# Patient Record
Sex: FEMALE | Race: WHITE | NOT HISPANIC OR LATINO | Employment: OTHER | URBAN - METROPOLITAN AREA
[De-identification: names, ages, dates, MRNs, and addresses within clinical notes are randomized per-mention and may not be internally consistent; named-entity substitution may affect disease eponyms.]

---

## 2017-03-01 ENCOUNTER — TELEPHONE (OUTPATIENT)
Dept: SURGERY | Facility: CLINIC | Age: 72
End: 2017-03-01

## 2017-12-04 ENCOUNTER — CONVERSION ENCOUNTER (OUTPATIENT)
Dept: OTHER | Facility: HOSPITAL | Age: 72
End: 2017-12-04

## 2018-03-20 ENCOUNTER — OFFICE VISIT CONVERTED (OUTPATIENT)
Dept: FAMILY MEDICINE CLINIC | Age: 73
End: 2018-03-20
Attending: FAMILY MEDICINE

## 2018-11-01 ENCOUNTER — OFFICE VISIT CONVERTED (OUTPATIENT)
Dept: FAMILY MEDICINE CLINIC | Age: 73
End: 2018-11-01
Attending: FAMILY MEDICINE

## 2018-12-05 ENCOUNTER — CONVERSION ENCOUNTER (OUTPATIENT)
Dept: OTHER | Facility: HOSPITAL | Age: 73
End: 2018-12-05

## 2019-03-18 ENCOUNTER — HOSPITAL ENCOUNTER (OUTPATIENT)
Dept: OTHER | Facility: HOSPITAL | Age: 74
Discharge: HOME | End: 2019-03-18
Attending: FAMILY MEDICINE

## 2019-03-18 LAB
25(OH)D3 SERPL-MCNC: 53.6 NG/ML (ref 30–100)
ALBUMIN SERPL-MCNC: 3.7 G/DL (ref 3.5–5)
ALBUMIN/GLOB SERPL: 1.1 {RATIO} (ref 1.4–2.6)
ALP SERPL-CCNC: 82 U/L (ref 43–160)
ALT SERPL-CCNC: 14 U/L (ref 10–40)
ANION GAP SERPL CALC-SCNC: 16 MMOL/L (ref 8–19)
AST SERPL-CCNC: 16 U/L (ref 15–50)
BILIRUB SERPL-MCNC: 0.42 MG/DL (ref 0.2–1.3)
BUN SERPL-MCNC: 11 MG/DL (ref 5–25)
BUN/CREAT SERPL: 11 {RATIO} (ref 6–20)
CALCIUM SERPL-MCNC: 9.5 MG/DL (ref 8.7–10.4)
CHLORIDE SERPL-SCNC: 105 MMOL/L (ref 99–111)
CHOLEST SERPL-MCNC: 176 MG/DL (ref 107–200)
CHOLEST/HDLC SERPL: 3.6 {RATIO} (ref 3–6)
CONV CO2: 25 MMOL/L (ref 22–32)
CONV TOTAL PROTEIN: 7.2 G/DL (ref 6.3–8.2)
CREAT UR-MCNC: 0.99 MG/DL (ref 0.5–0.9)
GFR SERPLBLD BASED ON 1.73 SQ M-ARVRAT: 57 ML/MIN/{1.73_M2}
GLOBULIN UR ELPH-MCNC: 3.5 G/DL (ref 2–3.5)
GLUCOSE SERPL-MCNC: 91 MG/DL (ref 65–99)
HDLC SERPL-MCNC: 49 MG/DL (ref 40–60)
LDLC SERPL CALC-MCNC: 111 MG/DL (ref 70–100)
OSMOLALITY SERPL CALC.SUM OF ELEC: 293 MOSM/KG (ref 273–304)
POTASSIUM SERPL-SCNC: 4.3 MMOL/L (ref 3.5–5.3)
SODIUM SERPL-SCNC: 142 MMOL/L (ref 135–147)
TRIGL SERPL-MCNC: 78 MG/DL (ref 40–150)
TSH SERPL-ACNC: 0.9 M[IU]/L (ref 0.27–4.2)
VLDLC SERPL-MCNC: 16 MG/DL (ref 5–37)

## 2019-03-21 ENCOUNTER — OFFICE VISIT CONVERTED (OUTPATIENT)
Dept: FAMILY MEDICINE CLINIC | Age: 74
End: 2019-03-21
Attending: FAMILY MEDICINE

## 2019-09-11 ENCOUNTER — OFFICE VISIT CONVERTED (OUTPATIENT)
Dept: FAMILY MEDICINE CLINIC | Age: 74
End: 2019-09-11
Attending: FAMILY MEDICINE

## 2019-09-11 ENCOUNTER — HOSPITAL ENCOUNTER (OUTPATIENT)
Dept: OTHER | Facility: HOSPITAL | Age: 74
Discharge: HOME | End: 2019-09-11
Attending: FAMILY MEDICINE

## 2019-09-11 LAB
25(OH)D3 SERPL-MCNC: 65.5 NG/ML (ref 30–100)
ALBUMIN SERPL-MCNC: 4.2 G/DL (ref 3.5–5)
ALBUMIN/GLOB SERPL: 1.4 {RATIO} (ref 1.4–2.6)
ALP SERPL-CCNC: 79 U/L (ref 43–160)
ALT SERPL-CCNC: 14 U/L (ref 10–40)
ANION GAP SERPL CALC-SCNC: 18 MMOL/L (ref 8–19)
AST SERPL-CCNC: 17 U/L (ref 15–50)
BILIRUB SERPL-MCNC: 0.41 MG/DL (ref 0.2–1.3)
BUN SERPL-MCNC: 11 MG/DL (ref 5–25)
BUN/CREAT SERPL: 11 {RATIO} (ref 6–20)
CALCIUM SERPL-MCNC: 9.8 MG/DL (ref 8.7–10.4)
CHLORIDE SERPL-SCNC: 105 MMOL/L (ref 99–111)
CONV CO2: 26 MMOL/L (ref 22–32)
CONV TOTAL PROTEIN: 7.3 G/DL (ref 6.3–8.2)
CREAT UR-MCNC: 0.99 MG/DL (ref 0.5–0.9)
ERYTHROCYTE [DISTWIDTH] IN BLOOD BY AUTOMATED COUNT: 14 % (ref 11.5–14.5)
GFR SERPLBLD BASED ON 1.73 SQ M-ARVRAT: 56 ML/MIN/{1.73_M2}
GLOBULIN UR ELPH-MCNC: 3.1 G/DL (ref 2–3.5)
GLUCOSE SERPL-MCNC: 86 MG/DL (ref 65–99)
HBA1C MFR BLD: 13.9 G/DL (ref 12–16)
HCT VFR BLD AUTO: 43.7 % (ref 37–47)
MCH RBC QN AUTO: 27.2 PG (ref 27–31)
MCHC RBC AUTO-ENTMCNC: 31.8 G/DL (ref 33–37)
MCV RBC AUTO: 85.5 FL (ref 81–99)
OSMOLALITY SERPL CALC.SUM OF ELEC: 297 MOSM/KG (ref 273–304)
PLATELET # BLD AUTO: 211 10*3/UL (ref 130–400)
PMV BLD AUTO: 9.6 FL (ref 7.4–10.4)
POTASSIUM SERPL-SCNC: 5.1 MMOL/L (ref 3.5–5.3)
RBC # BLD AUTO: 5.11 10*6/UL (ref 4.2–5.4)
SODIUM SERPL-SCNC: 144 MMOL/L (ref 135–147)
T4 FREE SERPL-MCNC: 1.7 NG/DL (ref 0.9–1.8)
TSH SERPL-ACNC: 0.46 M[IU]/L (ref 0.27–4.2)
VIT B12 SERPL-MCNC: 848 PG/ML (ref 211–911)
WBC # BLD AUTO: 5.29 10*3/UL (ref 4.8–10.8)

## 2019-11-27 ENCOUNTER — CONVERSION ENCOUNTER (OUTPATIENT)
Dept: CARDIOLOGY | Facility: CLINIC | Age: 74
End: 2019-11-27
Attending: INTERNAL MEDICINE

## 2020-03-02 ENCOUNTER — HOSPITAL ENCOUNTER (OUTPATIENT)
Dept: OTHER | Facility: HOSPITAL | Age: 75
Discharge: HOME | End: 2020-03-02
Attending: FAMILY MEDICINE

## 2020-03-02 LAB
25(OH)D3 SERPL-MCNC: 67.6 NG/ML (ref 30–100)
ALBUMIN SERPL-MCNC: 3.8 G/DL (ref 3.5–5)
ALBUMIN/GLOB SERPL: 1.1 {RATIO} (ref 1.4–2.6)
ALP SERPL-CCNC: 72 U/L (ref 43–160)
ALT SERPL-CCNC: 15 U/L (ref 10–40)
ANION GAP SERPL CALC-SCNC: 18 MMOL/L (ref 8–19)
AST SERPL-CCNC: 16 U/L (ref 15–50)
BASOPHILS # BLD MANUAL: 0.04 10*3/UL (ref 0–0.2)
BASOPHILS NFR BLD MANUAL: 0.7 % (ref 0–3)
BILIRUB SERPL-MCNC: 0.52 MG/DL (ref 0.2–1.3)
BUN SERPL-MCNC: 14 MG/DL (ref 5–25)
BUN/CREAT SERPL: 12 {RATIO} (ref 6–20)
CALCIUM SERPL-MCNC: 9.5 MG/DL (ref 8.7–10.4)
CHLORIDE SERPL-SCNC: 103 MMOL/L (ref 99–111)
CONV CO2: 24 MMOL/L (ref 22–32)
CONV TOTAL PROTEIN: 7.2 G/DL (ref 6.3–8.2)
CREAT UR-MCNC: 1.14 MG/DL (ref 0.5–0.9)
DEPRECATED RDW RBC AUTO: 43 FL
EOSINOPHIL # BLD MANUAL: 0.14 10*3/UL (ref 0–0.7)
EOSINOPHIL NFR BLD MANUAL: 2.4 % (ref 0–7)
ERYTHROCYTE [DISTWIDTH] IN BLOOD BY AUTOMATED COUNT: 13.6 % (ref 11.5–14.5)
GFR SERPLBLD BASED ON 1.73 SQ M-ARVRAT: 47 ML/MIN/{1.73_M2}
GLOBULIN UR ELPH-MCNC: 3.4 G/DL (ref 2–3.5)
GLUCOSE SERPL-MCNC: 89 MG/DL (ref 65–99)
GRANS (ABSOLUTE): 3.61 10*3/UL (ref 2–8)
GRANS: 62.3 % (ref 30–85)
HBA1C MFR BLD: 14.5 G/DL (ref 12–16)
HCT VFR BLD AUTO: 44.4 % (ref 37–47)
IMM GRANULOCYTES # BLD: 0.01 10*3/UL (ref 0–0.54)
IMM GRANULOCYTES NFR BLD: 0.2 % (ref 0–0.43)
LYMPHOCYTES # BLD MANUAL: 1.4 10*3/UL (ref 1–5)
LYMPHOCYTES NFR BLD MANUAL: 10.3 % (ref 3–10)
MCH RBC QN AUTO: 28 PG (ref 27–31)
MCHC RBC AUTO-ENTMCNC: 32.7 G/DL (ref 33–37)
MCV RBC AUTO: 85.9 FL (ref 81–99)
MONOCYTES # BLD AUTO: 0.6 10*3/UL (ref 0.2–1.2)
OSMOLALITY SERPL CALC.SUM OF ELEC: 292 MOSM/KG (ref 273–304)
PLATELET # BLD AUTO: 196 10*3/UL (ref 130–400)
PMV BLD AUTO: 9.6 FL (ref 7.4–10.4)
POTASSIUM SERPL-SCNC: 4.2 MMOL/L (ref 3.5–5.3)
RBC # BLD AUTO: 5.17 10*6/UL (ref 4.2–5.4)
SODIUM SERPL-SCNC: 141 MMOL/L (ref 135–147)
VARIANT LYMPHS NFR BLD MANUAL: 24.1 % (ref 20–45)
WBC # BLD AUTO: 5.8 10*3/UL (ref 4.8–10.8)

## 2020-03-03 ENCOUNTER — OFFICE VISIT CONVERTED (OUTPATIENT)
Dept: FAMILY MEDICINE CLINIC | Age: 75
End: 2020-03-03
Attending: FAMILY MEDICINE

## 2020-03-03 LAB — TSH SERPL-ACNC: 0.74 M[IU]/L (ref 0.27–4.2)

## 2020-07-20 ENCOUNTER — OFFICE VISIT CONVERTED (OUTPATIENT)
Dept: FAMILY MEDICINE CLINIC | Age: 75
End: 2020-07-20
Attending: FAMILY MEDICINE

## 2021-03-03 ENCOUNTER — HOSPITAL ENCOUNTER (OUTPATIENT)
Dept: OTHER | Facility: HOSPITAL | Age: 76
Discharge: HOME | End: 2021-03-03
Attending: INTERNAL MEDICINE

## 2021-04-01 ENCOUNTER — OFFICE VISIT CONVERTED (OUTPATIENT)
Dept: FAMILY MEDICINE CLINIC | Age: 76
End: 2021-04-01
Attending: FAMILY MEDICINE

## 2021-05-18 NOTE — PROGRESS NOTES
Eileen Plascencia  1945     Office/Outpatient Visit    Visit Date: Thu, Apr 1, 2021 09:37 am    Provider: Sarah Rea MD (Assistant: Jenni Juarez,  )    Location: [facility]        Electronically signed by Sarah Rea MD on  04/06/2021 02:46:23 PM                             Subjective:        CC: PT NOT TAKING ALENDRONATE.BP follow up    HPI:           In regard to the hypothyroidism, unspecified, she is currently taking Levothyroid, 88 mcg daily.  TSH was last checked 3 days ago.  The result was reported as normal.  She denies any related symptoms.  She reports no symptoms suggestive of adverse medication effect.            In regard to the essential (primary) hypertension, her current cardiac medication regimen includes an ACE inhibitor.  Compliance with treatment has been good; she takes her medication as directed.  She is tolerating the medication well without side effects.  She has not kept a blood pressure diary, but states that pressures have been well controlled.        Marina has been stressed the past year, she is  from her  right now and has been overwhelmed and not eating as well and has lost 20#s over past 6 months.  Her  is also not well and in the hospital .    ROS:     CONSTITUTIONAL:  Negative for chills, fatigue and fever.      EYES:  Negative for blurred vision, eye pain, and photophobia.      E/N/T:  Negative for hearing problems, E/N/T pain, congestion, rhinorrhea, epistaxis, hoarseness, and dental problems.      CARDIOVASCULAR:  Negative for chest pain, palpitations, tachycardia, orthopnea, and edema.      RESPIRATORY:  Negative for cough, dyspnea, and hemoptysis.      GASTROINTESTINAL:  Negative for abdominal pain, heartburn, constipation, diarrhea, and stool changes.      GENITOURINARY:  Negative for urinary incontinence.      MUSCULOSKELETAL:  Negative for back pain.      INTEGUMENTARY/BREAST:  Negative for atypical moles, dry skin,  pruritis, rashes, breast masses, and nipple discharge.      NEUROLOGICAL:  Negative for dizziness, headaches, paresthesias, and weakness.      PSYCHIATRIC:  Positive for feelings of stress.   Negative for anxiety, depression or suicidal thoughts.          Past Medical History / Family History / Social History:         Last Reviewed on 2021 10:26 AM by Sarah Rea    Past Medical History:             PAST MEDICAL HISTORY         Breast cancer: dx'd in ; no chemo or radiation     Hyperlipidemia    Hypertension     Hypothyroidism         GYNECOLOGICAL HISTORY:        Menopause at age 52.          CURRENT MEDICAL PROVIDERS:    Oncologist: Dr. Juarez    Ophthalmologist: FRANCISCO    surgeon for masectomy - Dr Reyes ([facility])             PAST MEDICAL HISTORY             CURRENT MEDICAL PROVIDERS:    Oncologist: Larry    Ophthalmologist: Holly         PAST MEDICAL HISTORY             ADVANCE DIRECTIVE Living will Pt has copy and Durable Power of  Pt has copy         PREVENTIVE HEALTH MAINTENANCE             BONE DENSITY: was last done 18 with the following abnormality noted-- list abnormalities osetopenia     COLORECTAL CANCER SCREENING: Up to date (colonoscopy q10y; sigmoidoscopy q5y; Cologuard q3y) was last done 2013; colonoscopy with normal results; 4/15/13     EYE EXAM: was last done 2019 DR. MELÉNDEZ cataracts, wears glasses     MAMMOGRAM: Done within last 2 years and results in are chart was last done 3/2/2020 with normal results     PAP SMEAR: was last done 3/2017 with normal results         PAST MEDICAL HISTORY             CURRENT MEDICAL PROVIDERS:    Dentist: Joey    Oncologist: Larry    Ophthalmologist: Holly         Surgical History:         Tonsillectomy/Adenoidectomy     right mastectomy -;     Procedures:    Colonoscopy ( 2000/ and 4-15- 2013 normal )         Family History:     Father:  at age 82; Cause of death was CAD;  Peripheral  Vascular Disease;  Type 2 Diabetes     Mother:  at age 81; Cause of death was cancer of pharynx/nasalpharyngeal         Social History:     Occupation: Homemaker     Marital Status:      Children: 1 child and 6 step children         Tobacco/Alcohol/Supplements:     Last Reviewed on 2020 12:31 PM by Yecenia De Leon    Tobacco: She has never smoked.          Alcohol:  Does not drink alcohol and never has.          Substance Abuse History:     Last Reviewed on 2015 07:43 PM by Manny Barber         Allergies:     Last Reviewed on 2020 12:31 PM by Yecenia De Leon    Benadryl:          Current Medications:     Last Reviewed on 2020 12:31 PM by Yecenia De Leon    benazepriL 10 mg oral tablet [Take 1 tablet by mouth twice daily]    levothyroxine 88 mcg oral tablet [Take 1 tablet by mouth once daily]    Loratadine 10mg Tablet [1 tab daily prn]    alendronate 70 mg oral tablet [once a week]    Vitamin D3 50 mcg (2,000 unit) oral capsule [1 capsule daily.]    Vitamin B-12 1,000 mcg oral tablet [1 tab daily]        Objective:        Vitals:         Current: 2021 9:46:27 AM    Ht:  5 ft, 2 in;  Wt: 138.2 lbs;  BMI: 25.3T: 95.9 F (temporal);  BP: 150/55 mm Hg (left arm, sitting);  P: 54 bpm (left arm (BP Cuff), sitting);  sCr: 1.14 mg/dL;  GFR: 40.85        Exams:     PHYSICAL EXAM:     GENERAL: vital signs recorded - well developed, well nourished;  no apparent distress;     EYES: PERRL, EOMI     E/N/T: EARS:  normal external auditory canals and tympanic membranes;  grossly normal hearing; OROPHARYNX:  normal mucosa, dentition, gingiva, and posterior pharynx;     NECK: range of motion is normal; carotid exam reveals no bruits;  no LAD/TM;     RESPIRATORY: normal respiratory rate and pattern with no distress; normal breath sounds with no rales, rhonchi, wheezes or rubs;     CARDIOVASCULAR: normal rate; rhythm is regular;  a systolic murmur is noted: it is grade 3/6 and Character  soft;  no edema;     GASTROINTESTINAL: nontender, nondistended; no hepatosplenomegaly or masses; no bruits;     MUSCULOSKELETAL: normal gait;     NEUROLOGICAL:  cranial nerves, motor and sensory function, reflexes, gait and coordination are all intact;     PSYCHIATRIC:  appropriate affect and demeanor; normal speech pattern; grossly normal memory;         Assessment:         D51.0   Vitamin B12 deficiency anemia due to intrinsic factor deficiency       E03.9   Hypothyroidism, unspecified       E78.1   Pure hyperglyceridemia       I10   Essential (primary) hypertension       E55.9   Vitamin D deficiency, unspecified       N18.2   Chronic kidney disease, stage 2 (mild)       R63.4   Abnormal weight loss       J30.9   Allergic rhinitis, unspecified       R01.1   Cardiac murmur, unspecified           ORDERS:         Lab Orders:       27852  HTNLP - [facility] CMP AND LIPID: [id number], [id number]  (Send-Out)            18499  THYII - [facility] Thyroid panel with TSH (00299, 25347)  (Send-Out)            03099  VITD - [facility] Vitamin D, 25 Hydroxy  (Send-Out)            08351  VB12 - [facility] Vitamin B12  (Send-Out)            35662  BDCB2 - [facility] CBC w/o diff  (Send-Out)            71964  IRON - [facility] Iron, serum  (Send-Out)              Procedures Ordered:       REFER  Referral to Specialist or Other Facility  (Send-Out)                      Plan:         Vitamin B12 deficiency anemia due to intrinsic factor deficiency    LABORATORY:  Labs ordered to be performed today include B12, CBC W/O DIFF, and Iron Serum.            Orders:       14621  VB12 - [facility] Vitamin B12  (Send-Out)            57370  BDCB2 - [facility] CBC w/o diff  (Send-Out)            77434  IRON - [facility] Iron, serum  (Send-Out)              Hypothyroidism, unspecified    LABORATORY:  Labs ordered to be performed today include Thyroid Panel.            Orders:       32393  THYII - [facility] Thyroid panel with TSH (27994, 14127)  (Send-Out)              Pure hyperglyceridemiawill recheck labs         Essential (primary) hypertensionBP are good at home, run 120/70s    LABORATORY:  Labs ordered to be performed today include HTN/Lipid Panel: CMP, Lipid.      RECOMMENDATIONS given include: avoidance of caffeine, avoidance of cigarette smoke, keep blood pressure log as directed, healthy carb, high healthy protein and high fiber diet, avoid salt in diet, f/u every 6 months for BP checks and labs, and exercise 30 min 3-4 days a week.            Orders:       02066  HTNLP - [facility] CMP AND LIPID: [id number], [id number]  (Send-Out)              Vitamin D deficiency, unspecifiedon 2000 units vitamin D daily    LABORATORY:  Labs ordered to be performed today include Vitamin D.            Orders:       54108  VITD - [facility] Vitamin D, 25 Hydroxy  (Send-Out)              Chronic kidney disease, stage 2 (mild)stable        Abnormal weight lossshe is to f/u with oncology, may need another colonoscopy, she had breast cancer and needs re eval for this, no GI issues, no chest pain/SOA.  s/p mastectomy        Allergic rhinitis, unspecifiedstable on loratadine        Cardiac murmur, unspecifiedshe needs to see cardiology-mild to mod aortic insuff and mild to moderate MR 2019-I again recommend cardiology referral to review medications and echo and make sure there is no other treatments we can do to preserve her long term heart function.  She failed to go in Sept-I will rerefer her to the heart doctor        REFERRALS:  Referral initiated to a cardiologist ( Dr. Selwyn Falcon, [facility] ).            Orders:       REFER  Referral to Specialist or Other Facility  (Send-Out)                  Patient Recommendations:        For  Essential (primary) hypertension:    Try to avoid or reduce the amount of caffeine intake. Avoid cigarette smoke. Keep a daily blood pressure log and report elevated blood pressure to provider as directed. Drink plenty of fluids.  Fever increases the loss of fluids and can lead to dehydration.               Charge Capture:         Primary Diagnosis:     D51.0  Vitamin B12 deficiency anemia due to intrinsic factor deficiency           Orders:      14550  Office/outpatient visit; established patient, level 4  (In-House)              E03.9  Hypothyroidism, unspecified     E78.1  Pure hyperglyceridemia     I10  Essential (primary) hypertension     E55.9  Vitamin D deficiency, unspecified     N18.2  Chronic kidney disease, stage 2 (mild)     R63.4  Abnormal weight loss     J30.9  Allergic rhinitis, unspecified     R01.1  Cardiac murmur, unspecified

## 2021-05-18 NOTE — PROGRESS NOTES
Eileen Plascencia 1945     Office/Outpatient Visit    Visit Date: Thu, Mar 21, 2019 11:18 am    Provider: Sarah Rea MD (Assistant: Elizabeth Fletcher MA)    Location: [facility]        Electronically signed by Sarah Rea MD on  03/21/2019 05:03:25 PM                             SUBJECTIVE:        CC:     Marina is a 73 year old White female.  She presents with Medicare Wellness.          HPI:         Marina is here for a Medicare wellness visit.  ADVANCE DIRECTIVES (Please update in PMH): Living will requested Returning to health checkup, the required HRA questions are integrated within this visit note. Family medical history and individual medical/surgical history were reviewed and updated.  A current height, weight, BMI, blood pressure, and pulse were recorded in the vitals section of the note and have been reviewed. Patient's medications, including supplements, were recorded in the chart and reviewed.  Current providers and suppliers were reviewed and updated.          Self-Assessment of Health: She rates her health as very good. She rates her confidence of being able to control/manage most of her health problems as somewhat confident. Her physical/emotional health has limited her social activites not at all.  A review of cognitive impairment was performed, including ability to drive a car, manage finances, and any memory changes, and was found to be negative.  A review of functional ability, including bathing, dressing, walking, and urine/bowel continence as well as level of safety was performed and was found to be negative.  Falls Risk: Has not had any falls or only one fall without injury in the past year.  She denies having trouble hearing the TV/radio when others do not, having to strain to hear or understand conversations and wearing hearing aid(s).  Concerning home safety, she reports that at home she DOES have adequate lighting, a skid resistant shower/tub, grab bars in the  bath, handrails on stairs and functioning smoke alarms, but not absence of throw rugs.  Physical Activity: She exercises for at least 20 minutes 3 or more days/week.; Type of diet patient normally eats is described as poor--needs improvement.  Tobacco: She has never smoked.  Preventative Health updated today         PHQ-9 Depression Screening: Completed form scanned and in chart; Total Score 2 Alcohol Consumption Screening: Completed form scanned and in chart; Total Score 0         Dx with acquired hypothyroidism; she is currently taking Levothyroid, 88 mcg daily.  TSH was last checked 3 days ago.  The result was reported as normal ( 0.9 mU/L ).  She denies any related symptoms.  She reports no symptoms suggestive of adverse medication effect.          Additionally, she presents with history of elevated cholesterol.  date of diagnosis 3/2012.  Current treatment includes a low cholesterol/low fat diet.  Compliance with treatment has been good; she maintains her low cholesterol diet.  She denies experiencing any hypercholesterolemia related symptoms.  Most recent lab tests include Hepatitis C Antibody:  <0.1 (s/co ratio) (03/20/2018), Vitamin D, 25-Hydroxy:  53.6 (ng/mL) (03/18/2019), B12:  1287 (pg/mL) (10/26/2018), Creatinine, Serum:  0.99 (mg/dl) (03/18/2019), Glom Filt Rate, Est:  57 (ml/min/1.73m2) (03/18/2019), Alkaline Phosphatase, Serum:  82 (U/L) (03/18/2019), ALT (SGPT):  14 (U/L) (03/18/2019), AST (SGOT):  16 (U/L) (03/18/2019), TSH:  0.900 (mIU/L) (03/18/2019), Total Cholesterol:  176 (mg/dL) (03/18/2019), HDL:  49 (mg/dL) (03/18/2019), Triglycerides:  78 (mg/dL) (03/18/2019), LDL:  111 (mg/dL) (03/18/2019), Hemoglobin:  13.80 (gm/dl) (10/26/2018), Hematocrit:  42.4 (%) (10/26/2018).          With regard to the essential hypertension, her current cardiac medication regimen includes an ACE inhibitor.  She has not kept a blood pressure diary, but states that pressures have been well controlled.  She is  tolerating the medication well without side effects.  Compliance with treatment has been good; she takes her medication as directed.      ROS:     CONSTITUTIONAL:  Negative for chills, fatigue and fever.      EYES:  Negative for blurred vision, eye pain, and photophobia.      E/N/T:  Negative for hearing problems, E/N/T pain, congestion, rhinorrhea, epistaxis, hoarseness, and dental problems.      CARDIOVASCULAR:  Negative for chest pain, palpitations, tachycardia, orthopnea, and edema.      RESPIRATORY:  Negative for cough, dyspnea, and hemoptysis.      GASTROINTESTINAL:  Negative for abdominal pain, heartburn, constipation, diarrhea, and stool changes.      GENITOURINARY:  Negative for urinary incontinence.      MUSCULOSKELETAL:  Negative for back pain.      INTEGUMENTARY/BREAST:  Negative for atypical moles, dry skin, pruritis, rashes, breast masses, and nipple discharge.      NEUROLOGICAL:  Negative for dizziness, headaches, paresthesias, and weakness.      PSYCHIATRIC:  Negative for anxiety, depression, and sleep disturbances.          PMH/FMH/SH:     Last Reviewed on 3/21/2019 12:00 PM by Sarah Rea    Past Medical History:             PAST MEDICAL HISTORY         Breast cancer: dx'd in ; no chemo or radiation     Hyperlipidemia    Hypertension     Hypothyroidism         GYNECOLOGICAL HISTORY:        Menopause at age 52.          CURRENT MEDICAL PROVIDERS:    Oncologist: Dr. Juarez    Ophthalmologist: FRANCISCO    surgeon for masectomy - Dr Reyes ([facility])             PAST MEDICAL HISTORY             CURRENT MEDICAL PROVIDERS:    Oncologist: Larry    Ophthalmologist: Holly         PAST MEDICAL HISTORY             ADVANCE DIRECTIVE Living will Pt has copy and Durable Power of  Pt has copy         PREVENTIVE HEALTH MAINTENANCE             BONE DENSITY: was last done 18 with the following abnormality noted-- list abnormalities osetopenia     COLORECTAL CANCER SCREENING: Up  to date (colonoscopy q10y; sigmoidoscopy q5y; Cologuard q3y) was last done 2013; colonoscopy with normal results; 4/15/13     EYE EXAM: was last done 2018 cataracts, wears glasses     MAMMOGRAM: Done within last 2 years and results in are chart was last done 2018 with normal results     PAP SMEAR: was last done 3/2017 with normal results         PAST MEDICAL HISTORY             CURRENT MEDICAL PROVIDERS:    Dentist: Joey    Oncologist: Larry    Ophthalmologist: Holly         Surgical History:         Tonsillectomy/Adenoidectomy      right mastectomy ;     Procedures:    Colonoscopy ( 2000/ and 4-15- 2013 normal )         Family History:     Father:  at age 82; Cause of death was CAD;  Peripheral Vascular Disease;  Type 2 Diabetes     Mother:  at age 81; Cause of death was cancer of pharynx/nasalpharyngeal         Social History:     Occupation: Homemaker     Marital Status:      Children: 1 child and 6 step children         Tobacco/Alcohol/Supplements:     Last Reviewed on 3/21/2019 12:00 PM by Sarah Rea    Tobacco: She has never smoked.          Alcohol:  Does not drink alcohol and never has.          Substance Abuse History:     Last Reviewed on 2015 07:43 PM by Manny Barber             Immunizations:     Prevnar 13 (Pneumococcal PCV 13) 3/4/2016     Fluzone pf (3+ years dose) 10/22/2015     Pneomovax 2013     Fluzone High-Dose pf (>=65 yr) 2016     Fluzone High-Dose pf (>=65 yr) 2017     Fluzone High-Dose pf (>=65 yr) 2018     Adacel (Tdap) 2018         Allergies:     Last Reviewed on 2017 01:42 PM by Patricia Floresryl:        Current Medications:     Last Reviewed on 2018 11:23 AM by Elizabeth Fletcher    Levothyroxine Sodium 0.088mg Tablet Take 1 tablet(s) by mouth daily     Benazepril HCl 10mg Tablet one a day     Alendronate Sodium 70mg Tablet once a week     Anastrozole 1mg Tablet Take 1  tablet(s) by mouth daily     Loratadine 10mg Tablet 1 tab daily prn     Vitamin B12 (Cyanocobalamin) 1,000mcg Tablet 1 tab daily  #90 (Ninety) tablet(s)     Vitamin D3 (Cholecalciferol Vitamin D3) 2,000IU Capsules 1 capsule daily.  #100 (One [place]) capsule(s)         OBJECTIVE:        Vitals:         Current: 3/21/2019 11:32:39 AM    Ht:  5 ft, 2 in;  Wt: 164.2 lbs;  BMI: 30.0    T: 98 F (oral);  BP: 152/55 mm Hg (left arm, sitting);  P: 50 bpm (left arm (BP Cuff), sitting);  sCr: 0.99 mg/dL;  GFR: 52.10    VA: 20/30 OD, 20/30 OS (near, with correction)        Exams:     PHYSICAL EXAM:     GENERAL: vital signs recorded - well developed, well nourished;  no apparent distress;     EYES: lids and lacrimal system are normal in appearance; extraocular movements intact; conjunctiva and cornea are normal; PERRLA;     E/N/T: EARS:  normal external auditory canals and tympanic membranes;  grossly normal hearing;     NECK: carotid exam reveals no bruits;     RESPIRATORY: normal respiratory rate and pattern with no distress; normal breath sounds with no rales, rhonchi, wheezes or rubs;     CARDIOVASCULAR: normal rate; rhythm is regular;  no systolic murmur;     GASTROINTESTINAL: nontender, nondistended; no hepatosplenomegaly or masses; no bruits;     BREAST/INTEGUMENT: BREASTS: breast exam is normal without masses, skin changes, or nipple discharge;     MUSCULOSKELETAL: normal gait;     NEUROLOGICAL:  cranial nerves, motor and sensory function, reflexes, gait and coordination are all intact;     PSYCHIATRIC:  appropriate affect and demeanor; normal speech pattern; grossly normal memory;         ASSESSMENT           V70.0   Z00.00  Health checkup              DDx:     V79.0   Z13.89  Screening for depression              DDx:     V10.3   Z85.3  History of breast cancer              DDx:     281.0   D51.0  Pernicious anemia              DDx:     424.1   I35.9  Aortic valve regurgitation              DDx:     585.2   N18.2   Chronic renal insufficiency, stage 2              DDx:     394.0   I05.0  Mitral valve stenosis              DDx:     244.8   E03.9  Acquired hypothyroidism              DDx:     268.9   E55.9  Vitamin D deficiency, unspecified              DDx:     272.0   E78.1  Elevated cholesterol              DDx:     401.1   I10  Essential hypertension              DDx:     V76.19   Z12.39  Screening breast exam - other              DDx:     278.02   E66.3  Overweight              DDx:         ORDERS:         Meds Prescribed:       Refill of: Levothyroxine Sodium 0.088mg Tablet Take 1 tablet(s) by mouth daily  #30 (Thirty) tablet(s) Refills: 1       Refill of: Benazepril HCl 10mg Tablet one a day  #30 (Thirty) tablet(s) Refills: 1       Refill of: Alendronate Sodium 70mg Tablet once a week  #13 (Thirteen) tablet(s) Refills: 1         Radiology/Test Orders:       3014F  Screening mammography results documented and reviewed (PV)1  (In-House)         3017F  Colorectal CA screen results documented and reviewed (PV)  (In-House)           Procedures Ordered:         Annual wellness visit, includes a PPPS, subsequent visit  (In-House)         REFER  Referral to Specialist or Other Facility  (Send-Out)           Other Orders:         Depression screen negative  (In-House)         1101F  Pt screen for fall risk; document no falls in past year or only 1 fall w/o injury in past year (KAMARI)  (In-House)           Negative EtOH screen  (In-House)           Calculated BMI above the upper parameter and a follow-up plan was documented in the medical record  (In-House)                   PLAN:          Health checkup  MEDICARE WELLNESS EXAM-SHE IS UTD ON BREAST EXAM/SCREENING MAMMOGRAM/COLONOSCOPY/DEXA/PNEUMOVAX/ td/PREVNAR FLU VACCINE , DONE FOR PAP/PELVIC, DUE FOR SHINGLES, NO FALL RISK, NO MEMORY ISSUES OR DEPRESSION, SHE LIVES WITH HER , SHE IS ABLE TO DRIVE AND PERFORM ADL'S/FINANCES, SHE WAS GIVEN A COPY OF HA ON  PREV SERVICES/SAFETY HANDOUT WAS GIVEN TO HER. HEARING IS ADEQUATE, MD LIST UPDATED, NO URINARY INCONTINENCE, SHE HAS A LIVING WILL           Prescriptions:       Refill of: Levothyroxine Sodium 0.088mg Tablet Take 1 tablet(s) by mouth daily  #30 (Thirty) tablet(s) Refills: 1       Refill of: Benazepril HCl 10mg Tablet one a day  #30 (Thirty) tablet(s) Refills: 1       Refill of: Alendronate Sodium 70mg Tablet once a week  #13 (Thirteen) tablet(s) Refills: 1           Orders:         Annual wellness visit, includes a PPPS, subsequent visit  (In-House)            Screening for depression     MIPS Negative Depression Screen Negative alcohol screen     MAMMOGRAM: Done within last 2 years and results in are chart     COLORECTAL CANCER SCREENING: Results are in chart     BMI Elevated - Follow-Up Plan: She was provided education on weight loss strategies           Orders:         Depression screen negative  (In-House)         1101F  Pt screen for fall risk; document no falls in past year or only 1 fall w/o injury in past year (KAMARI)  (In-House)           Negative EtOH screen  (In-House)         3014F  Screening mammography results documented and reviewed (PV)1  (In-House)         3017F  Colorectal CA screen results documented and reviewed (PV)  (In-House)           Calculated BMI above the upper parameter and a follow-up plan was documented in the medical record  (In-House)            History of breast cancer she sees Dr Juarez, gets yearly mammograms          Pernicious anemia well controlled on po B12          Aortic valve regurgitation stable, REPEAT ECHO IN 11/2019-SOONER IF SHE EXPERIENCES FATIGUE          Chronic renal insufficiency, stage 2 stable          Mitral valve stenosis stable          Acquired hypothyroidism well controlled          Vitamin D deficiency, unspecified well controlled          Elevated cholesterol stable and well controlled          Essential hypertension stable           Screening breast exam - other normal breast exam, mammo is UTD          Overweight         REFERRALS:  Referral initiated to Dietitian.            Orders:       REFER  Referral to Specialist or Other Facility  (Send-Out)               CHARGE CAPTURE           **Please note: ICD descriptions below are intended for billing purposes only and may not represent clinical diagnoses**        Primary Diagnosis:         V70.0 Health checkup            Z00.00    Encounter for general adult medical examination without abnormal findings              Orders:          [id number]   Preventive medicine, established patient, age 65+ years  (In-House)                Annual wellness visit, includes a PPPS, subsequent visit  (In-House)           V79.0 Screening for depression            Z13.89    Encounter for screening for other disorder              Orders:          13631 -25  Office/outpatient visit; established patient, level 4  (In-House)                Depression screen negative  (In-House)             1101F   Pt screen for fall risk; document no falls in past year or only 1 fall w/o injury in past year (KAMARI)  (In-House)                Negative EtOH screen  (In-House)             3014F   Screening mammography results documented and reviewed (PV)1  (In-House)             3017F   Colorectal CA screen results documented and reviewed (PV)  (In-House)                Calculated BMI above the upper parameter and a follow-up plan was documented in the medical record  (In-House)           V10.3 History of breast cancer            Z85.3    Personal history of malignant neoplasm of breast    281.0 Pernicious anemia            D51.0    Vitamin B12 deficiency anemia due to intrinsic factor deficiency    424.1 Aortic valve regurgitation            I35.9    Nonrheumatic aortic valve disorder, unspecified    585.2 Chronic renal insufficiency, stage 2            N18.2    Chronic kidney disease, stage 2 (mild)    394.0 Mitral valve  stenosis            I05.0    Rheumatic mitral stenosis    244.8 Acquired hypothyroidism            E03.9    Hypothyroidism, unspecified    268.9 Vitamin D deficiency, unspecified            E55.9    Vitamin D deficiency, unspecified    272.0 Elevated cholesterol            E78.1    Pure hyperglyceridemia    401.1 Essential hypertension            I10    Essential (primary) hypertension    V76.19 Screening breast exam - other            Z12.39    Encounter for other screening for malignant neoplasm of breast    278.02 Overweight            E66.3    Overweight

## 2021-05-18 NOTE — PROGRESS NOTES
Eileen Plascencia  1945     Office/Outpatient Visit    Visit Date: Tue, Mar 3, 2020 12:04 pm    Provider: Sarah Rea MD (Assistant: Elizabeth Fletcher MA)    Location: [facility]        Electronically signed by Sarah Rea MD on  03/05/2020 02:11:11 PM                             Subjective:        CC: Marina is a 74 year old White female.  She is here today following a transition of care from the emergency department ( [place] on 2/27/20 for a fall ).          HPI:           PHQ-9 Depression Screening: Completed form scanned and in chart; Total Score 4           In regard to the hypothyroidism, unspecified, she is currently taking Levothyroid, 88 mcg daily.  TSH was last checked 3 days ago.  The result was reported as normal.  She denies any related symptoms.  She reports no symptoms suggestive of adverse medication effect.            Additionally, she presents with history of essential (primary) hypertension.  her current cardiac medication regimen includes an ACE inhibitor.  Compliance with treatment has been good; she takes her medication as directed.  She is tolerating the medication well without side effects.  She has not kept a blood pressure diary, but states that pressures have been well controlled.        On Thursday 2/27/2020 She was at her son in laws house and on the steps and a large dog tried to jump on her so she backed up and didnt realize she was so close to the edge, and stepped off and hit her head on the brick on the side of the house, no LOC, no headache, she went to the ER and had a CCT head neg for brain bleed but did show nasal swelling c/w polyps, and CT neck was neg for fractures or herniated discs.  She did hit her L hip on ground and she has a big bruise-she is walking fine and her pain is minimal, as well as she hit her  L foream and she has a small bruise on her forearm    ROS:     CONSTITUTIONAL:  Negative for chills, fatigue and fever.      EYES:   Negative for blurred vision, eye pain, and photophobia.      E/N/T:  Negative for hearing problems, E/N/T pain, congestion, rhinorrhea, epistaxis, hoarseness, and dental problems.      CARDIOVASCULAR:  Negative for chest pain, palpitations, tachycardia, orthopnea, and edema.      RESPIRATORY:  Negative for cough, dyspnea, and hemoptysis.      GASTROINTESTINAL:  Negative for abdominal pain, heartburn, constipation, diarrhea, and stool changes.      GENITOURINARY:  Negative for urinary incontinence.      MUSCULOSKELETAL:  Negative for back pain.      INTEGUMENTARY/BREAST:  Negative for atypical moles, dry skin, pruritis, rashes, breast masses, and nipple discharge.      NEUROLOGICAL:  Negative for dizziness, headaches, paresthesias, and weakness.      PSYCHIATRIC:  Positive for feelings of stress and hypersomnia; sleeping more during the day.   Negative for anxiety or depression.          Past Medical History / Family History / Social History:         Last Reviewed on 3/03/2020 12:30 PM by Sarah Rea    Past Medical History:             PAST MEDICAL HISTORY         Breast cancer: dx'd in ; no chemo or radiation     Hyperlipidemia    Hypertension     Hypothyroidism         GYNECOLOGICAL HISTORY:        Menopause at age 52.          CURRENT MEDICAL PROVIDERS:    Oncologist: Dr. Juarez    Ophthalmologist: FRANCISCO    surgeon for masectomy - Dr Reyes ([facility])             PAST MEDICAL HISTORY             CURRENT MEDICAL PROVIDERS:    Oncologist: Larry    Ophthalmologist: Holly         PAST MEDICAL HISTORY             ADVANCE DIRECTIVE Living will Pt has copy and Durable Power of  Pt has copy         PREVENTIVE HEALTH MAINTENANCE             BONE DENSITY: was last done 18 with the following abnormality noted-- list abnormalities osetopenia     COLORECTAL CANCER SCREENING: Up to date (colonoscopy q10y; sigmoidoscopy q5y; Cologuard q3y) was last done 2013; colonoscopy with normal  results; 4/15/13     EYE EXAM: was last done 2018 cataracts, wears glasses     MAMMOGRAM: Done within last 2 years and results in are chart was last done 2018 with normal results     PAP SMEAR: was last done 3/2017 with normal results         PAST MEDICAL HISTORY             CURRENT MEDICAL PROVIDERS:    Dentist: Joey    Oncologist: Larry    Ophthalmologist: Holly         Surgical History:         Tonsillectomy/Adenoidectomy     right mastectomy ;     Procedures:    Colonoscopy ( 2000/ and 4-15- 2013 normal )         Family History:     Father:  at age 82; Cause of death was CAD;  Peripheral Vascular Disease;  Type 2 Diabetes     Mother:  at age 81; Cause of death was cancer of pharynx/nasalpharyngeal         Social History:     Occupation: Homemaker     Marital Status:      Children: 1 child and 6 step children         Tobacco/Alcohol/Supplements:     Last Reviewed on 3/03/2020 12:11 PM by Elizabeth Fletcher    Tobacco: She has never smoked.          Alcohol:  Does not drink alcohol and never has.          Substance Abuse History:     Last Reviewed on 2015 07:43 PM by Manny Babrer    None         Allergies:     Last Reviewed on 2019 10:35 AM by Niurka Rg    Benadryl:          Current Medications:     Last Reviewed on 3/03/2020 12:12 PM by Elizabeth Fletcher    levothyroxine 88 mcg oral tablet [Take 1 tablet(s) by mouth daily]    benazepriL 10 mg oral tablet [TAKE 1 TABLET BY MOUTH TWICE DAILY]    Anastrozole 1mg Tablet [Take 1 tablet(s) by mouth daily]    Loratadine 10mg Tablet [1 tab daily prn]    Vitamin D3 50 mcg (2,000 unit) oral capsule [1 capsule daily.]    alendronate 70 mg oral tablet [once a week]    Vitamin B-12 1,000 mcg oral tablet [1 tab daily]        Objective:        Vitals:         Current: 3/3/2020 12:16:02 PM    Ht:  5 ft, 2 in;  Wt: 158.6 lbs;  BMI: 29.0T: 97.7 F (oral);  BP: 154/51 mm Hg (left arm, sitting);  P: 50 bpm (left arm  (BP Cuff), sitting);  sCr: 0.99 mg/dL;  GFR: 50.61        Repeat:     12:53:0 PM  BP:   145/48mm Hg (left arm, sitting) 12:53:16 PM  P:   49bpm (left arm (BP Cuff), sitting)     Exams:     PHYSICAL EXAM:     GENERAL: vital signs recorded - well developed, well nourished;  no apparent distress;     EYES: PERRL, EOMI     E/N/T: EARS:  normal external auditory canals and tympanic membranes;  grossly normal hearing; OROPHARYNX:  normal mucosa, dentition, gingiva, and posterior pharynx;     NECK: carotid exam reveals no bruits;     RESPIRATORY: normal respiratory rate and pattern with no distress; normal breath sounds with no rales, rhonchi, wheezes or rubs;     CARDIOVASCULAR: normal rate; rhythm is regular;  a systolic murmur is noted: it is grade 3/6 and Character soft;  no edema;     GASTROINTESTINAL: nontender, nondistended; no hepatosplenomegaly or masses; no bruits;     MUSCULOSKELETAL: normal gait; bruise noted on L forearm, and L hip, FROM of L hip/shoulder/wrist/elbow, bones NT to palpation, FROM of neck and NT to palpation, 5/5 MS strength in UE B with normal symm sensory exam, her scalp in post L upper scalp is tender to palpation, no laceration/bruise or contusion appreciated    NEUROLOGICAL:  cranial nerves, motor and sensory function, reflexes, gait and coordination are all intact;     PSYCHIATRIC:  appropriate affect and demeanor; normal speech pattern; grossly normal memory;         Lab/Test Results:         Vitamin D, 25-Hydroxy: 65.5 (ng/mL) (09/11/2019),     Creatinine, Serum: 0.99 (mg/dl) (09/11/2019),     Glom Filt Rate, Est: 56 (ml/min/1.73m2) (09/11/2019),     Alkaline Phosphatase, Serum: 79 (U/L) (09/11/2019),     ALT (SGPT): 14 (U/L) (09/11/2019),     AST (SGOT): 17 (U/L) (09/11/2019),     TSH: 0.455 (mIU/L) (09/11/2019),     Hemoglobin: 13.90 (gm/dl) (09/11/2019),     Hematocrit: 43.7 (%) (09/11/2019),     Total Cholesterol: 176 (mg/dL) (03/18/2019),     HDL: 49 (mg/dL) (03/18/2019),      Triglycerides: 78 (mg/dL) (03/18/2019),     LDL: 111 (mg/dL) (03/18/2019),             Assessment:         Z13.31   Encounter for screening for depression       E03.9   Hypothyroidism, unspecified       I10   Essential (primary) hypertension       E55.9   Vitamin D deficiency, unspecified       N18.2   Chronic kidney disease, stage 2 (mild)       D51.0   Vitamin B12 deficiency anemia due to intrinsic factor deficiency       E78.1   Pure hyperglyceridemia       W19.XXXD   Unspecified fall, subsequent encounter       J33.0   Polyp of nasal cavity       S50.12XS   Contusion of left forearm, sequela       M54.2   Cervicalgia           ORDERS:         Lab Orders:       72016  VITD - [facility] Vitamin D, 25 Hydroxy  (Send-Out)            89842  VB12 - [facility] Vitamin B12  (Send-Out)            64887  TSH - [facility] TSH  (Send-Out)              Other Orders:         Depression screen negative  (In-House)            1101F  Pt screen for fall risk; document no falls in past year or only 1 fall w/o injury in past year (KAMARI)  (In-House)                      Plan:         Encounter for screening for depression    MIPS PHQ-9 Depression Screening: Completed form scanned and in chart; Total Score 4; Negative Depression Screen           Orders:         Depression screen negative  (In-House)            1101F  Pt screen for fall risk; document no falls in past year or only 1 fall w/o injury in past year (KAMARI)  (In-House)              Hypothyroidism, unspecifieddue for labs    LABORATORY:  Labs ordered to be performed today include TSH.            Orders:       23373  TSH - [facility] TSH  (Send-Out)              Essential (primary) hypertensionstable and well controlled on ace        Vitamin D deficiency, unspecifiedstable on vitamin D supplementation    LABORATORY:  Labs ordered to be performed today include Vitamin D.            Orders:       78461  VITD - [facility] Vitamin D, 25 Hydroxy  (Send-Out)              Chronic kidney disease, stage 2  (mild)stable, on ace,will recheck labs        Vitamin B12 deficiency anemia due to intrinsic factor deficiency    LABORATORY:  Labs ordered to be performed today include B12.            Orders:       [id number]  VB12 - [facility] Vitamin B12  (Send-Out)              Pure hyperglyceridemiawill check labs        Unspecified fall, subsequent encountershe is doing well, she is still sore in her L neck and shoulder and L forearm as well as over her out L hip        Polyp of nasal cavityas above-benign and chronic, she can still breath fine thru her nose        Contusion of left forearm, sequelahealing        CervicalgiaFROM of neck, CT C spine-no acute findings            Charge Capture:         Primary Diagnosis:     Z13.31  Encounter for screening for depression           Orders:      90059  Office/outpatient visit; established patient, level 4  (In-House)              Depression screen negative  (In-House)            1101F  Pt screen for fall risk; document no falls in past year or only 1 fall w/o injury in past year (KAMARI)  (In-House)              E03.9  Hypothyroidism, unspecified     I10  Essential (primary) hypertension     E55.9  Vitamin D deficiency, unspecified     N18.2  Chronic kidney disease, stage 2 (mild)     D51.0  Vitamin B12 deficiency anemia due to intrinsic factor deficiency     E78.1  Pure hyperglyceridemia     W19.XXXD  Unspecified fall, subsequent encounter     J33.0  Polyp of nasal cavity     S50.12XS  Contusion of left forearm, sequela     M54.2  Cervicalgia

## 2021-05-18 NOTE — PROGRESS NOTES
Eileen Plascencia 1945     Office/Outpatient Visit    Visit Date: Tue, Mar 20, 2018 11:18 am    Provider: Sarah Rea MD (Assistant: Savanna Olguin MA)    Location: [facility]        Electronically signed by Sarah Rea MD on  03/20/2018 12:53:00 PM                             SUBJECTIVE:        CC: medicare physical         HPI:         Marina is here for a Medicare wellness visit.  Individual and family medical history was reviewed and updated A list of current providers and suppliers reviewed and updated A current height, weight, BMI, blood pressure, and pulse were recorded in the vitals section of the note and have been reviewed A review of cognitive impairment was performed and was negative.  A review of functional ability and level of safety was performed and was negative She denies having trouble hearing the TV/radio when others do not, having to strain to hear or understand conversations and wearing hearing aid(s).  Concerning home safety, she reports that at home she DOES have throw rugs, grab bars in the bath, handrails on stairs and functioning smoke alarms, but not poor lighting or a slippery bath or shower.      Physical Activity: Exercises at least 3 times per week; Has not had any falls or only one fall without injury in the past year.  Advance Care Directive updated today in [facility] Tobacco: She has never smoked.    Preventative Health updated today         PHQ-9 Depression Screening: Completed form scanned and in chart; Total Score 0/27         Dx with acquired hypothyroidism; she is currently taking Levothyroid, 88 mcg daily.  TSH was last checked 4 months ago.  The result was reported as normal.  She denies any related symptoms.      vitamin D def-she is on vitamin D supplementation     B12 deficiency, she is on OTC B12     stage 2 renal failure         Essential hypertension details; current nonpharmacologic treatment includes low sodium diet.  Her current cardiac medication  regimen includes an ACE inhibitor.  She has not kept a blood pressure diary, but states that pressures have been well controlled.  She is tolerating the medication well without side effects.  Compliance with treatment has been good; she takes her medication as directed.      Echo shows mod mitral stenosis, mild pulm HTN, mild aortic stenosis, noraml EF 60%     ROS:     CONSTITUTIONAL:  Negative for chills, fatigue and fever.      EYES:  Negative for blurred vision, eye pain, and photophobia.      CARDIOVASCULAR:  Negative for chest pain, palpitations, tachycardia, orthopnea, and edema.      RESPIRATORY:  Negative for cough, dyspnea, and hemoptysis.      GASTROINTESTINAL:  Negative for abdominal pain, heartburn, constipation, diarrhea, and stool changes.      GENITOURINARY:  Negative for urinary incontinence.      INTEGUMENTARY/BREAST:  Negative for atypical moles, dry skin, pruritis, rashes, breast masses, and nipple discharge.      NEUROLOGICAL:  Negative for dizziness, headaches, paresthesias, and weakness.      PSYCHIATRIC:  Negative for anxiety, depression, and sleep disturbances.          PMH/FMH/SH:     Last Reviewed on 3/20/2018 12:05 PM by Sarah Rea    Past Medical History:             PAST MEDICAL HISTORY         Breast cancer: dx'd in ; no chemo or radiation     Hyperlipidemia    Hypertension     Hypothyroidism         GYNECOLOGICAL HISTORY:        Menopause at age 52.          CURRENT MEDICAL PROVIDERS:    Oncologist: Dr. Juarez    Ophthalmologist: FRANCISCO    surgeon for masectomy - Dr Reyes ([facility])             PAST MEDICAL HISTORY             CURRENT MEDICAL PROVIDERS:    Oncologist: Larry    Ophthalmologist: Holly         PAST MEDICAL HISTORY             ADVANCE DIRECTIVE Living will Pt has copy and Durable Power of  Pt has copy         PREVENTIVE HEALTH MAINTENANCE             BONE DENSITY: was last done 2017     COLORECTAL CANCER SCREENING: Up to date  (colonoscopy q10y; sigmoidoscopy q5y; Cologuard q3y) was last done 2013     EYE EXAM: was last done 2017 cataracts, wears glasses     MAMMOGRAM: was last done 2017 with normal results     PAP SMEAR: was last done 3/2017 with normal results         Surgical History:         Tonsillectomy/Adenoidectomy      right mastectomy ;     Procedures:    Colonoscopy ( 2000/ and 4-15- 2013 normal )         Family History:     Father:  at age 82; Cause of death was CAD;  Peripheral Vascular Disease;  Type 2 Diabetes     Mother:  at age 81; Cause of death was cancer of pharynx/nasalpharyngeal         Social History:     Occupation: Homemaker     Marital Status:      Children: 1 child and 6 step children         Tobacco/Alcohol/Supplements:     Last Reviewed on 3/20/2018 12:05 PM by Sarah Rea    Tobacco: She has never smoked.          Alcohol:  Does not drink alcohol and never has.          Substance Abuse History:     Last Reviewed on 2015 07:43 PM by Manny Barber             Immunizations:     Prevnar 13 (Pneumococcal PCV 13) 3/4/2016     Fluzone pf (3+ years dose) 10/22/2015     Pneomovax 2013     Fluzone High-Dose pf (>=65 yr) 2016     Fluzone High-Dose pf (>=65 yr) 2017         Allergies:     Last Reviewed on 2017 01:42 PM by Patricia Flores    Benadryl:        Current Medications:     Last Reviewed on 2017 01:44 PM by Patricia Flores    Anastrozole 1mg Tablet Take 1 tablet(s) by mouth daily     Loratadine 10mg Tablet 1 tab daily prn     Benazepril HCl 10mg Tablet one a day     Levothyroxine Sodium 0.088mg Tablet Take 1 tablet(s) by mouth daily     Vitamin B12 (Cyanocobalamin) 1,000mcg Tablet 1 tab daily  #90 (Ninety) tablet(s)     Alendronate Sodium 70mg Tablet once a week     Vitamin D3 (Cholecalciferol Vitamin D3) 2,000IU Capsules 1 capsule daily.  #100 (One Logan) capsule(s)         OBJECTIVE:        Vitals:         Current:  3/20/2018 11:21:41 AM    Ht:  5 ft, 2 in;  Wt: 160.4 lbs;  BMI: 29.3    T: 99.1 F (oral);  BP: 156/72 mm Hg (left arm, sitting);  P: 64 bpm (left arm (BP Cuff), sitting);  sCr: 0.98 mg/dL;  GFR: 52.85        Repeat:     12:32:12 PM     BP:   132/74mm Hg (left arm, sitting)         Exams:     PHYSICAL EXAM:     GENERAL: vital signs recorded - well developed, well nourished;  no apparent distress;     EYES: lids and lacrimal system are normal in appearance; extraocular movements intact; conjunctiva and cornea are normal; PERRLA;     E/N/T: EARS:  normal external auditory canals and tympanic membranes;  grossly normal hearing;     NECK: carotid exam reveals no bruits;     RESPIRATORY: CTA B WITHOUT WHEEZING/RALES OR RHONCHI, NORMAL RESP. EFFORT     CARDIOVASCULAR: normal rate; rhythm is regular;  no systolic murmur;     GASTROINTESTINAL: nontender, nondistended; no hepatosplenomegaly or masses; no bruits;     BREAST/INTEGUMENT: BREASTS: breast exam is normal without masses, skin changes, or nipple discharge;     NEUROLOGICAL:  cranial nerves, motor and sensory function, reflexes, gait and coordination are all intact;     PSYCHIATRIC:  appropriate affect and demeanor; normal speech pattern; grossly normal memory;         Lab/Test Results:     AUDIO AND VISUAL SCREENING     Vision Testing: Near Right 20/25 with glasses Left 20/30 with glasses Bilateral 20/25 with glasses; Performed by:  [facility]         ASSESSMENT           V70.0   Z00.00  Health checkup              DDx:     V79.0   Z13.89  Screening for depression              DDx:     244.8   E03.9  Acquired hypothyroidism              DDx:     268.9   E55.9  Vitamin D deficiency, unspecified              DDx:     266.2   E53.9  B12 deficiency              DDx:     585.2   N18.2  Chronic renal insufficiency, stage 2              DDx:     401.1   I10  Essential hypertension              DDx:     394.0   I05.0  Mitral valve stenosis              DDx:         ORDERS:          Meds Prescribed:       Refill of: Benazepril HCl 10mg Tablet one a day  #90 (Ninety) tablet(s) Refills: 1       Refill of: Levothyroxine Sodium 0.088mg Tablet Take 1 tablet(s) by mouth daily  #90 (Ninety) tablet(s) Refills: 1       Refill of: Alendronate Sodium 70mg Tablet once a week  #13 (Thirteen) tablet(s) Refills: 1         Radiology/Test Orders:       3014F  Screening mammography results documented and reviewed (PV)1  (In-House)         3017F  Colorectal CA screen results documented and reviewed (PV)  (In-House)           Lab Orders:       84100  VITD - [facility] Vitamin D, 25 Hydroxy  (Send-Out)         87896  VB12 - [facility] Vitamin B12  (Send-Out)         48035  COMP - [facility] Comp. Metabolic Panel  (Send-Out)         91866  TSH - [facility] TSH  (Send-Out)           [facility] Hepatitis C AB (Medicare Screen)  (Send-Out)           Procedures Ordered:         Annual wellness visit, includes a PPPS, subsequent visit  (In-House)           Other Orders:         Depression screen negative  (In-House)         1101F  Pt screen for fall risk; document no falls in past year or only 1 fall w/o injury in past year (KAMARI)  (In-House)           Negative EtOH screen  (In-House)           Calculated BMI within normal parameters and documented  (In-House)                   PLAN:          Health checkup MEDICARE WELLNESS EXAM-SHE IS UTD ON MAMMOGRAM/DEXA (due in 12/2018), UTD on PAP/PELVIC, UTD ON COLONOSCOPY/PNEUMOVAX/ FLU/PREVNAR DUE FOR TDAP VACCINE, DEFERS SHINGLES, NO FALL RISK, NO MEMORY ISSUES OR DEPRESSION, SHE LIVES WITH HER , SHE IS ABLE TO DRIVE AND PERFORM ADL'S/FINANCES, SHE NOW HAS A LIVING WILL IN PLACE, GIVEN A PREV SERVICES/SAFETY HANDOUT WAS GIVEN TO HER. HEARING IS ADEQUATE.     LABORATORY:  Labs ordered to be performed today include Hepatitis C Ab (Medicare Screen).            Orders:         Annual wellness visit, includes a PPPS, subsequent visit  (In-House)           [facility]  Hepatitis C AB (Medicare Screen)  (Send-Out)            Screening for depression     MIPS Negative Depression Screen Negative alcohol screen     MAMMOGRAM: Done within last 2 years and results in are chart     COLORECTAL CANCER SCREENING: Results are in chart     BMI Normal - No follow-up plan necessary           Orders:         Depression screen negative  (In-House)         1101F  Pt screen for fall risk; document no falls in past year or only 1 fall w/o injury in past year (KAMARI)  (In-House)           Negative EtOH screen  (In-House)         3014F  Screening mammography results documented and reviewed (PV)1  (In-House)         3017F  Colorectal CA screen results documented and reviewed (PV)  (In-House)           Calculated BMI within normal parameters and documented  (In-House)            Acquired hypothyroidism     LABORATORY:  Labs ordered to be performed today include B12, Comprehensive metabolic panel, and TSH.            Orders:       13640  VB12 - [facility] Vitamin B12  (Send-Out)         58997  COMP - [facility] Comp. Metabolic Panel  (Send-Out)         61565  TSH - [facility] TSH  (Send-Out)            Vitamin D deficiency, unspecified on 2000 units daily     LABORATORY:  Labs ordered to be performed today include Vitamin D.            Orders:       87103  VITD - [facility] Vitamin D, 25 Hydroxy  (Send-Out)            B12 deficiency on B complex          Chronic renal insufficiency, stage 2 due for labs          Essential hypertension stable on meds           Prescriptions:       Refill of: Benazepril HCl 10mg Tablet one a day  #90 (Ninety) tablet(s) Refills: 1       Refill of: Levothyroxine Sodium 0.088mg Tablet Take 1 tablet(s) by mouth daily  #90 (Ninety) tablet(s) Refills: 1       Refill of: Alendronate Sodium 70mg Tablet once a week  #13 (Thirteen) tablet(s) Refills: 1             CHARGE CAPTURE           **Please note: ICD descriptions below are intended for billing purposes only and may not represent clinical  diagnoses**        Primary Diagnosis:         V70.0 Health checkup            Z00.00    Encounter for general adult medical examination without abnormal findings              Orders:          17611   Preventive medicine, established patient, age 65+ years  (In-House)                Annual wellness visit, includes a PPPS, subsequent visit  (In-House)           V79.0 Screening for depression            Z13.89    Encounter for screening for other disorder              Orders:          13861 -25  Office/outpatient visit; established patient, level 4  (In-House)                Depression screen negative  (In-House)             1101F   Pt screen for fall risk; document no falls in past year or only 1 fall w/o injury in past year (KAMARI)  (In-House)                Negative EtOH screen  (In-House)             3014F   Screening mammography results documented and reviewed (PV)1  (In-House)             3017F   Colorectal CA screen results documented and reviewed (PV)  (In-House)                Calculated BMI within normal parameters and documented  (In-House)           244.8 Acquired hypothyroidism            E03.9    Hypothyroidism, unspecified    268.9 Vitamin D deficiency, unspecified            E55.9    Vitamin D deficiency, unspecified    266.2 B12 deficiency            E53.9    Vitamin B deficiency, unspecified    585.2 Chronic renal insufficiency, stage 2            N18.2    Chronic kidney disease, stage 2 (mild)    401.1 Essential hypertension            I10    Essential (primary) hypertension    394.0 Mitral valve stenosis            I05.0    Rheumatic mitral stenosis

## 2021-05-18 NOTE — PROGRESS NOTES
Eileen Plascencia  1945     Office/Outpatient Visit    Visit Date: Mon, Jul 20, 2020 12:25 pm    Provider: Sarah Rea MD (Assistant: Yecenia De Leon RN)    Location: [facility]        Electronically signed by Sarah Rea MD on  07/21/2020 02:13:39 PM                             Subjective:        CC: Marina is a 75 year old White female.  medicare wellness, no other issues today;         HPI:           Marina is here for a Medicare wellness visit.  The required HRA questions are integrated within this visit note. Family medical history and individual medical/surgical history were reviewed and updated.  A current height, weight, BMI, blood pressure, and pulse were recorded in the vitals section of the note and have been reviewed. Patient's medications, including supplements, were recorded in the chart and reviewed.  Current providers and suppliers were reviewed and updated.          Self-Assessment of Health: She rates her health as very good. She rates her confidence of being able to control/manage most of her health problems as very confident. Her physical/emotional health has limited her social activites moderately.  A review of cognitive impairment was performed, including ability to drive a car, manage finances, and any memory changes, and was found to be negative.  A review of functional ability, including bathing, dressing, walking, and urine/bowel continence as well as level of safety was performed and was found to be negative.  Falls Risk: Has not had any falls or only one fall without injury in the past year.  1 FALL, DUE TO DOGS, NO LIFE ALERTShe denies having trouble hearing the TV/radio when others do not, having to strain to hear or understand conversations and wearing hearing aid(s).  Concerning home safety, she reports that at home she DOES have adequate lighting, a skid resistant shower/tub, grab bars in the bath, handrails on stairs, functioning smoke alarms and  absence of throw rugs.  IN BATHROOM, RUBBER BACKING        Immunization Status: Up to date; Age>60, no shingles vaccination; Physical Activity: She exercises for at least 20 minutes 3 or more days/week.; Type of diet patient normally eats is described as well-balanced with fruits and vegetables Tobacco: She has never smoked.  Preventative Health updated today           Concerning hypothyroidism, unspecified, she is currently taking Levothyroid, 88 mcg daily.  TSH was last checked 3 days ago.  The result was reported as normal.  She denies any related symptoms.  She reports no symptoms suggestive of adverse medication effect.            Dx with essential (primary) hypertension; her current cardiac medication regimen includes an ACE inhibitor.  Compliance with treatment has been good; she takes her medication as directed.  She is tolerating the medication well without side effects.  She has not kept a blood pressure diary, but states that pressures have been well controlled.      ROS:     CONSTITUTIONAL:  Negative for chills, fatigue and fever.      EYES:  Negative for blurred vision, eye pain, and photophobia.      E/N/T:  Negative for hearing problems, E/N/T pain, congestion, rhinorrhea, epistaxis, hoarseness, and dental problems.      CARDIOVASCULAR:  Negative for chest pain, palpitations, tachycardia, orthopnea, and edema.      RESPIRATORY:  Negative for cough, dyspnea, and hemoptysis.      GASTROINTESTINAL:  Negative for abdominal pain, heartburn, constipation, diarrhea, and stool changes.      GENITOURINARY:  Negative for urinary incontinence.      MUSCULOSKELETAL:  Negative for back pain.      INTEGUMENTARY/BREAST:  Negative for atypical moles, dry skin, pruritis, rashes, breast masses, and nipple discharge.      NEUROLOGICAL:  Negative for dizziness, headaches, paresthesias, and weakness.      PSYCHIATRIC:  Positive for feelings of stress and hypersomnia; sleeping more during the day.   Negative for anxiety or  depression.          Past Medical History / Family History / Social History:         Last Reviewed on 3/03/2020 12:30 PM by Sarah Rea    Past Medical History:             PAST MEDICAL HISTORY         Breast cancer: dx'd in ; no chemo or radiation     Hyperlipidemia    Hypertension     Hypothyroidism         GYNECOLOGICAL HISTORY:        Menopause at age 52.          CURRENT MEDICAL PROVIDERS:    Oncologist: Dr. Juarez    Ophthalmologist: FRANCISCO    surgeon for masectomy - Dr Reyes ([facility])             PAST MEDICAL HISTORY             CURRENT MEDICAL PROVIDERS:    Oncologist: Larry    Ophthalmologist: Holly         PAST MEDICAL HISTORY             ADVANCE DIRECTIVE Living will Pt has copy and Durable Power of  Pt has copy         PREVENTIVE HEALTH MAINTENANCE             BONE DENSITY: was last done 18 with the following abnormality noted-- list abnormalities osetopenia     COLORECTAL CANCER SCREENING: Up to date (colonoscopy q10y; sigmoidoscopy q5y; Cologuard q3y) was last done 2013; colonoscopy with normal results; 4/15/13     EYE EXAM: was last done 2018 cataracts, wears glasses     MAMMOGRAM: Done within last 2 years and results in are chart was last done 2018 with normal results     PAP SMEAR: was last done 3/2017 with normal results         PAST MEDICAL HISTORY             CURRENT MEDICAL PROVIDERS:    Dentist: Joey    Oncologist: Larry    Ophthalmologist: Holly         Surgical History:         Tonsillectomy/Adenoidectomy     right mastectomy ;     Procedures:    Colonoscopy ( 2000/ and 4-15- 2013 normal )         Family History:     Father:  at age 82; Cause of death was CAD;  Peripheral Vascular Disease;  Type 2 Diabetes     Mother:  at age 81; Cause of death was cancer of pharynx/nasalpharyngeal         Social History:     Occupation: Homemaker     Marital Status:      Children: 1 child and 6 step children          Tobacco/Alcohol/Supplements:     Last Reviewed on 3/03/2020 12:11 PM by Elizabeth Fletcher    Tobacco: She has never smoked.          Alcohol:  Does not drink alcohol and never has.          Substance Abuse History:     Last Reviewed on 4/08/2015 07:43 PM by Manny Barber         Immunizations:     Prevnar 13 (Pneumococcal PCV 13) 3/4/2016    Fluzone pf (3+ years dose) 10/22/2015    Pneomovax 4/1/2013    Fluzone High-Dose pf (>=65 yr) 9/23/2016    Fluzone High-Dose pf (>=65 yr) 9/12/2017    Fluzone High-Dose pf (>=65 yr) 9/19/2018    Fluzone High-Dose pf (>=65 yr) 9/30/2019    Adacel (Tdap) 9/19/2018        Allergies:     Last Reviewed on 3/03/2020 12:11 PM by Elizabeth Fletcher    Benadryl:          Current Medications:     Last Reviewed on 7/20/2020 12:31 PM by Yecenia De Leon    benazepriL 10 mg oral tablet [TAKE 1 TABLET BY MOUTH TWICE DAILY]    levothyroxine 88 mcg oral tablet [Take 1 tablet by mouth once daily]    Loratadine 10mg Tablet [1 tab daily prn]    Vitamin D3 50 mcg (2,000 unit) oral capsule [1 capsule daily.]    alendronate 70 mg oral tablet [once a week]    Vitamin B-12 1,000 mcg oral tablet [1 tab daily]        Objective:        Vitals:         Current: 7/20/2020 12:28:45 PM    Ht:  5 ft, 2 in;  Wt: 156.2 lbs;  BMI: 28.6T: 98.1 F (oral);  BP: 131/40 mm Hg (left arm, sitting);  P: 49 bpm (left arm (BP Cuff), sitting);  sCr: 1.14 mg/dL;  GFR: 43.04        Exams:     PHYSICAL EXAM:     GENERAL: vital signs recorded - well developed, well nourished;  no apparent distress;     EYES: PERRL, EOMI     E/N/T: EARS:  normal external auditory canals and tympanic membranes;  grossly normal hearing; OROPHARYNX:  normal mucosa, dentition, gingiva, and posterior pharynx;     NECK: carotid exam reveals no bruits;     RESPIRATORY: normal respiratory rate and pattern with no distress; normal breath sounds with no rales, rhonchi, wheezes or rubs;     CARDIOVASCULAR: normal rate; rhythm is regular;  a  systolic murmur is noted: it is grade 3/6 and Character soft;  no edema;     GASTROINTESTINAL: nontender, nondistended; no hepatosplenomegaly or masses; no bruits;     MUSCULOSKELETAL: normal gait;     NEUROLOGICAL:  cranial nerves, motor and sensory function, reflexes, gait and coordination are all intact;     PSYCHIATRIC:  appropriate affect and demeanor; normal speech pattern; grossly normal memory;         Assessment:         Z00.00   Encounter for general adult medical examination without abnormal findings       E03.9   Hypothyroidism, unspecified       I10   Essential (primary) hypertension       E55.9   Vitamin D deficiency, unspecified       N18.2   Chronic kidney disease, stage 2 (mild)       R00.1   Bradycardia, unspecified       Z78.0   Asymptomatic menopausal state       Z13.31   Encounter for screening for depression       F32.0   Major depressive disorder, single episode, mild           ORDERS:         Meds Prescribed:       [Refilled] benazepriL 10 mg oral tablet [TAKE 1 TABLET BY MOUTH TWICE DAILY], #180 (one hundred and eighty) each, Refills: 0 (zero)         Radiology/Test Orders:       [id number]  DXA, bone density study, 1 or more sites; axial skeleton (eg hips, pelvis, spine)  (Send-Out)            3017F  Colorectal CA screen results documented and reviewed (PV)  (In-House)              Lab Orders:       [id number]  TSH - [facility] TSH  (Send-Out)            51838  VITD - [facility] Vitamin D, 25 Hydroxy  (Send-Out)            23969  HTNLP - [facility] CMP AND LIPID: [id number], [id number]  (Send-Out)              Procedures Ordered:       REFER  Referral to Specialist or Other Facility  (Send-Out)              Annual wellness visit, includes a PPPS, subsequent visit  (In-House)              Other Orders:         Depression screen negative  (In-House)            1101F  Pt screen for fall risk; document no falls in past year or only 1 fall w/o injury in past year (KAMARI)  (In-House)              Screening mammogram results  documented  (Send-Out)                      Plan:         Encounter for general adult medical examination without abnormal findingsMEDICARE WELLNESS EXAM-SHE IS UTD ON BREAST EXAM/SCREENING MAMMOGRAM/COLONOSCOPY/DEXA/PNEUMOVAX/ TD/PREVNAR FLU VACCINE , DONE FOR PAP/PELVIC, DUE FOR SHINGLES, RECOMMEND YEARLY FLU VACCINATION, NO FALL RISK-1 FALL IN FEBRUARY-DUE TO DOGS, MILD MEMORY ISSUES, MILD  DEPRESSION OVER HER HUSBANDS HEALTH, SHE LIVES WITH HER  ADN IS HIS CAREGIVER, SHE IS ABLE TO DRIVE AND PERFORM ADL'S/FINANCES, SHE WAS GIVEN A COPY OF HA ON [facility] SERVICES/SAFETY HANDOUT WAS GIVEN TO HER. HEARING IS ADEQUATE, MD LIST UPDATED, NO URINARY INCONTINENCE, SHE HAS A LIVING WILL, HEARING IS ADEQUATE.    ADVANCE DIRECTIVES (Please update in [facility]): Living will ADVISED TO BRING IN COPY           Orders:         Annual wellness visit, includes a PPPS, subsequent visit  (In-House)              Hypothyroidism, unspecifiedDUE FOR LABS IN SEPTEMBER, STABLE ON MEDS    LABORATORY:  Labs ordered to be performed today include TSH.            Orders:       [id number]  TSH - [facility] TSH  (Send-Out)              Essential (primary) hypertensionoverall well controlledSHE DIDNT TOLERATE BENAZEPRIL 20 MG DAILY, BUT DOES WELL WITH 10 MG BID    LABORATORY:  Labs ordered to be performed today include HTN/Lipid Panel: CMP, Lipid.      RECOMMENDATIONS given include: avoidance of caffeine, avoidance of cigarette smoke, keep blood pressure log as directed, healthy carb, high healthy protein and high fiber diet, avoid salt in her diet, f/u every 6 months for BP checks and labs, and exercise 30 min 3-4 days a week.            Prescriptions:       [Refilled] benazepriL 10 mg oral tablet [TAKE 1 TABLET BY MOUTH TWICE DAILY], #180 (one hundred and eighty) each, Refills: 0 (zero)           Orders:       [id number]  HTNLP - [facility] CMP AND LIPID: [id number], [id number]  (Send-Out)              Vitamin D deficiency, unspecifiedSTABLE ON 2000 UNITS A DAY     LABORATORY:  Labs ordered to be performed today include Vitamin D.            Orders:       [id number]  VITD - [facility] Vitamin D, 25 Hydroxy  (Send-Out)              Chronic kidney disease, stage 2 (mild)stable, labs due in Sept        Bradycardia, unspecifiedlow,  with echo showing mild to mod aortic insuff and mild to moderate MR-I again recommend cardiology referral to review medications and echo and make sure there is no other treatments we can do to preserve her long term heart function.        REFERRALS:  Referral initiated to a cardiologist ( Dr. Selwyn Falcon, [facility] ).            Orders:       REFER  Referral to Specialist or Other Facility  (Send-Out)              Asymptomatic menopausal statestable on meds          Orders:       36256  DXA, bone density study, 1 or more sites; axial skeleton (eg hips, pelvis, spine)  (Send-Out)              Encounter for screening for depression    MIPS PHQ-9 Depression Screening: Completed form scanned and in chart; Total Score 7; Positive Depression Screen but after further evaluation the patient does not have a diagnosis of depression.   Smoking cessation encouraged. Counseling for less than 3 minutes.            Orders:         Depression screen negative  (In-House)            1101F  Pt screen for fall risk; document no falls in past year or only 1 fall w/o injury in past year (KAMARI)  (In-House)              Screening mammogram results documented  (Send-Out)            3017F  Colorectal CA screen results documented and reviewed (PV)  (In-House)              Major depressive disorder, single episode, mildrecommend therapy, she defers but I will give her a HA on therapists to call if she changes her mind.          Patient Education Handouts:       M[facility] Health and Substance Abuse Services [facility]              Patient Recommendations:        For  Encounter for general adult medical examination without abnormal findings:    I also  recommend ADVISED TO BRING IN COPY.          For  Essential (primary) hypertension:    Try to avoid or reduce the amount of caffeine intake. Avoid cigarette smoke. Keep a daily blood pressure log and report elevated blood pressure to provider as directed. Drink plenty of fluids.  Fever increases the loss of fluids and can lead to dehydration.              Charge Capture:         Primary Diagnosis:     Z00.00  Encounter for general adult medical examination without abnormal findings           Orders:      [id number]  Preventive medicine, established patient, age 65+ years  (In-House)              Annual wellness visit, includes a PPPS, subsequent visit  (In-House)              E03.9  Hypothyroidism, unspecified           Orders:      01463-64  Office/outpatient visit; established patient, level 4  (In-House)              I10  Essential (primary) hypertension     E55.9  Vitamin D deficiency, unspecified     N18.2  Chronic kidney disease, stage 2 (mild)     R00.1  Bradycardia, unspecified     Z78.0  Asymptomatic menopausal state     Z13.31  Encounter for screening for depression           Orders:        Depression screen negative  (In-House)            1101F  Pt screen for fall risk; document no falls in past year or only 1 fall w/o injury in past year (KAMARI)  (In-House)            3017F  Colorectal CA screen results documented and reviewed (PV)  (In-House)              F32.0  Major depressive disorder, single episode, mild         ADDENDUMS:      ____________________________________    Addendum: 04/28/2021 02:28 PM - One, Team         Radiology Orders Faxed to:        User Entered Recipient; Number [phone]

## 2021-05-18 NOTE — PROGRESS NOTES
Eileen Plascencia 1945     Office/Outpatient Visit    Visit Date: Thu, Nov 1, 2018 11:19 am    Provider: Sarah Rea MD (Assistant: Elizabeth Fletcher MA)    Location: [facility]        Electronically signed by Sarah Rea MD on  11/02/2018 04:10:49 PM                             SUBJECTIVE:        CC: BP and chol eval         HPI:         Marina presents with acquired hypothyroidism.  She is currently taking Levothyroid, 88 mcg daily.  TSH was last checked 6 months ago.  The result was reported as normal.  She denies any related symptoms.  She reports no symptoms suggestive of adverse medication effect.      vitamin D def, she is on  vitamin D supplementation-1000 units a day and her vitamin D was 57 on labs         Additionally, she presents with history of elevated cholesterol.  date of diagnosis 3/2012.  Current treatment includes a low cholesterol/low fat diet.  Compliance with treatment has been good; she maintains her low cholesterol diet.  She denies experiencing any hypercholesterolemia related symptoms.  Most recent lab tests include Alkaline Phosphatase:  79 (U/L) (10/26/2018), ALT (SGPT):  14 (U/L) (10/26/2018), AST (SGOT):  16 (U/L) (10/26/2018), Creatinine, Serum:  1.02 (mg/dl) (10/26/2018), Glom Filt Rate, Est:  55 (ml/min/1.73m2) (10/26/2018), TSH:  1.610 (mIU/L) (10/26/2018), Total Cholesterol:  184 (mg/dL) (10/26/2018), HDL:  48 (mg/dL) (10/26/2018), Triglycerides:  91 (mg/dL) (10/26/2018), LDL:  118 (mg/dL) (10/26/2018).          Additionally, she presents with history of essential hypertension.  her current cardiac medication regimen includes an ACE inhibitor.  She has not kept a blood pressure diary, but states that pressures have been well controlled.  She is tolerating the medication well without side effects.  Compliance with treatment has been good; she takes her medication as directed.      ROS:     CONSTITUTIONAL:  Negative for chills, fatigue and fever.      EYES:   Negative for blurred vision, eye pain, and photophobia.      CARDIOVASCULAR:  Negative for chest pain, dizziness, pedal edema and tachycardia.      RESPIRATORY:  Negative for cough, dyspnea, and hemoptysis.      GASTROINTESTINAL:  Negative for abdominal pain, heartburn, constipation, diarrhea, and stool changes.      GENITOURINARY:  Negative for urinary incontinence.      INTEGUMENTARY/BREAST:  Negative for atypical moles, dry skin, pruritis, rashes, breast masses, and nipple discharge.      NEUROLOGICAL:  Negative for dizziness, headaches, paresthesias, and weakness.      PSYCHIATRIC:  Negative for anxiety, depression, and sleep disturbances.          PMH/FMH/SH:     Last Reviewed on 2018 11:52 AM by Sarah Rea    Past Medical History:             PAST MEDICAL HISTORY         Breast cancer: dx'd in ; no chemo or radiation     Hyperlipidemia    Hypertension     Hypothyroidism         GYNECOLOGICAL HISTORY:        Menopause at age 52.          CURRENT MEDICAL PROVIDERS:    Oncologist: Dr. Juarez    Ophthalmologist: FRANCISCO    surgeon for masectomy - Dr Reyes ([facility])             PAST MEDICAL HISTORY             CURRENT MEDICAL PROVIDERS:    Oncologist: Larry    Ophthalmologist: Holly         PAST MEDICAL HISTORY             ADVANCE DIRECTIVE Living will Pt has copy and Durable Power of  Pt has copy         PREVENTIVE HEALTH MAINTENANCE             BONE DENSITY: was last done 2017     COLORECTAL CANCER SCREENING: Up to date (colonoscopy q10y; sigmoidoscopy q5y; Cologuard q3y) was last done 2013     EYE EXAM: was last done 2017 cataracts, wears glasses     MAMMOGRAM: was last done 2017 with normal results     PAP SMEAR: was last done 3/2017 with normal results         Surgical History:         Tonsillectomy/Adenoidectomy      right mastectomy ;     Procedures:    Colonoscopy ( 2000/ and 4-15- 2013 normal )         Family History:     Father:  at age  82; Cause of death was CAD;  Peripheral Vascular Disease;  Type 2 Diabetes     Mother:  at age 81; Cause of death was cancer of pharynx/nasalpharyngeal         Social History:     Occupation: Homemaker     Marital Status:      Children: 1 child and 6 step children         Tobacco/Alcohol/Supplements:     Last Reviewed on 2018 11:52 AM by Sarah Rea    Tobacco: She has never smoked.          Alcohol:  Does not drink alcohol and never has.          Substance Abuse History:     Last Reviewed on 2015 07:43 PM by Manny Barber             Immunizations:     Prevnar 13 (Pneumococcal PCV 13) 3/4/2016     Fluzone pf (3+ years dose) 10/22/2015     Pneomovax 2013     Fluzone High-Dose pf (>=65 yr) 2016     Fluzone High-Dose pf (>=65 yr) 2017     Fluzone High-Dose pf (>=65 yr) 2018     Adacel (Tdap) 2018         Allergies:     Last Reviewed on 2017 01:42 PM by Patricia Floresl:        Current Medications:     Last Reviewed on 3/20/2018 11:24 AM by Savanna Olguinazepril HCl 10mg Tablet one a day     Levothyroxine Sodium 0.088mg Tablet Take 1 tablet(s) by mouth daily     Alendronate Sodium 70mg Tablet once a week     Anastrozole 1mg Tablet Take 1 tablet(s) by mouth daily     Loratadine 10mg Tablet 1 tab daily prn     Vitamin B12 (Cyanocobalamin) 1,000mcg Tablet 1 tab daily  #90 (Ninety) tablet(s)     Vitamin D3 (Cholecalciferol Vitamin D3) 2,000IU Capsules 1 capsule daily.  #100 ([place]) capsule(s)         OBJECTIVE:        Vitals:         Current: 2018 11:27:21 AM    Ht:  5 ft, 2 in;  Wt: 168 lbs;  BMI: 30.7    T: 98.2 F (oral);  BP: 160/65 mm Hg (left arm, sitting);  P: 47 bpm (left arm (BP Cuff), sitting);  sCr: 1.02 mg/dL;  GFR: 51.06        Exams:     PHYSICAL EXAM:     GENERAL: vital signs recorded - well developed, well nourished;  no apparent distress;     EYES: PERRL, EOMI     E/N/T: EARS:  normal external auditory  canals and tympanic membranes;  grossly normal hearing; OROPHARYNX:  normal mucosa, dentition, gingiva, and posterior pharynx;     NECK: carotid exam reveals no bruits;     RESPIRATORY: normal respiratory rate and pattern with no distress; normal breath sounds with no rales, rhonchi, wheezes or rubs;     CARDIOVASCULAR: normal rate; rhythm is regular;  a systolic murmur is noted: it is grade 3/6 and Character soft;  no edema;     GASTROINTESTINAL: nontender, nondistended; no hepatosplenomegaly or masses; no bruits;     MUSCULOSKELETAL: normal gait;     NEUROLOGICAL:  cranial nerves, motor and sensory function, reflexes, gait and coordination are all intact;     PSYCHIATRIC:  appropriate affect and demeanor; normal speech pattern; grossly normal memory;         ASSESSMENT           244.8   E03.9  Acquired hypothyroidism              DDx:     268.9   E55.9  Vitamin D deficiency, unspecified              DDx:     272.0   E78.1  Elevated cholesterol              DDx:     401.1   I10  Essential hypertension              DDx:     266.2   E53.9  B12 deficiency              DDx:     V76.12   Z12.31  Screening mammogram - other              DDx:         ORDERS:         Radiology/Test Orders:       [id number]  Screening mammography bi 2-view inc CAD  (Send-Out)                   PLAN:          Acquired hypothyroidism stable and well controlled on meds          Vitamin D deficiency, unspecified well controlled on 1000 units a day          Elevated cholesterol stable and well controlled on meds          Essential hypertension white coat HTN          B12 deficiency cont B12 supplementation OTC          Screening mammogram - other referal for screening L mammogram in Dec           Orders:       [id number]  Screening mammography bi 2-view inc CAD  (Send-Out)               CHARGE CAPTURE           **Please note: ICD descriptions below are intended for billing purposes only and may not represent clinical diagnoses**        Primary Diagnosis:          244.8 Acquired hypothyroidism            E03.9    Hypothyroidism, unspecified              Orders:          [id number]   Office/outpatient visit; established patient, level 4  (In-House)           268.9 Vitamin D deficiency, unspecified            E55.9    Vitamin D deficiency, unspecified    272.0 Elevated cholesterol            E78.1    Pure hyperglyceridemia    401.1 Essential hypertension            I10    Essential (primary) hypertension    266.2 B12 deficiency            E53.9    Vitamin B deficiency, unspecified    V76.12 Screening mammogram - other            Z12.31    Encounter for screening mammogram for malignant neoplasm of breast

## 2021-05-18 NOTE — PROGRESS NOTES
Eileen Plascencia 1945     Office/Outpatient Visit    Visit Date: Wed, Sep 11, 2019 10:33 am    Provider: Sarah Rea MD (Assistant: Niurka Rg RN)    Location: [facility]        Electronically signed by Sarah Rea MD on  09/12/2019 10:05:48 AM                             SUBJECTIVE:        CC: f/u med refills BP/chol/thyroid         HPI:         Marina presents with essential hypertension.  Her current cardiac medication regimen includes an ACE inhibitor.  She has not kept a blood pressure diary, but states that pressures have been well controlled.  She is tolerating the medication well without side effects.  Compliance with treatment has been good; she takes her medication as directed.          In regard to the acquired hypothyroidism, she is currently taking Levothyroid, 88 mcg daily.  TSH was last checked 3 days ago.  The result was reported as normal ( 0.9 mU/L ).  She denies any related symptoms.  She reports no symptoms suggestive of adverse medication effect.          Additionally, she presents with history of elevated cholesterol.  date of diagnosis 3/2012.  Current treatment includes a low cholesterol/low fat diet.  Compliance with treatment has been good; she maintains her low cholesterol diet.  She denies experiencing any hypercholesterolemia related symptoms.  Most recent lab tests include Vitamin D, 25-Hydroxy:  53.6 (ng/mL) (03/18/2019), Hematocrit:  42.4 (%) (10/26/2018), Hemoglobin:  13.80 (gm/dl) (10/26/2018), TSH:  0.900 (mIU/L) (03/18/2019), Total Cholesterol:  176 (mg/dL) (03/18/2019), HDL:  49 (mg/dL) (03/18/2019), Triglycerides:  78 (mg/dL) (03/18/2019), LDL:  111 (mg/dL) (03/18/2019).      ROS:     CONSTITUTIONAL:  Negative for chills, fatigue and fever.      EYES:  Negative for blurred vision, eye pain, and photophobia.      E/N/T:  Negative for hearing problems, E/N/T pain, congestion, rhinorrhea, epistaxis, hoarseness, and dental problems.      CARDIOVASCULAR:   Negative for chest pain, palpitations, tachycardia, orthopnea, and edema.      RESPIRATORY:  Negative for cough, dyspnea, and hemoptysis.      GASTROINTESTINAL:  Negative for abdominal pain, heartburn, constipation, diarrhea, and stool changes.      GENITOURINARY:  Negative for urinary incontinence.      MUSCULOSKELETAL:  Negative for back pain.      INTEGUMENTARY/BREAST:  Negative for atypical moles, dry skin, pruritis, rashes, breast masses, and nipple discharge.      NEUROLOGICAL:  Negative for dizziness, headaches, paresthesias, and weakness.      PSYCHIATRIC:  Positive for feelings of stress and hypersomnia; sleeping more during the day.   Negative for anxiety or depression.          PMH/FMH/SH:     Last Reviewed on 2019 11:16 AM by Sarah Rea    Past Medical History:             PAST MEDICAL HISTORY         Breast cancer: dx'd in ; no chemo or radiation     Hyperlipidemia    Hypertension     Hypothyroidism         GYNECOLOGICAL HISTORY:        Menopause at age 52.          CURRENT MEDICAL PROVIDERS:    Oncologist: Dr. Juarez    Ophthalmologist: FRANCISCO    surgeon for masectomy - Dr Reyes ([facility])             PAST MEDICAL HISTORY             CURRENT MEDICAL PROVIDERS:    Oncologist: Larry    Ophthalmologist: Holly         PAST MEDICAL HISTORY             ADVANCE DIRECTIVE Living will Pt has copy and Durable Power of  Pt has copy         PREVENTIVE HEALTH MAINTENANCE             BONE DENSITY: was last done 18 with the following abnormality noted-- list abnormalities osetopenia     COLORECTAL CANCER SCREENING: Up to date (colonoscopy q10y; sigmoidoscopy q5y; Cologuard q3y) was last done 2013; colonoscopy with normal results; 4/15/13     EYE EXAM: was last done 2018 cataracts, wears glasses     MAMMOGRAM: Done within last 2 years and results in are chart was last done 2018 with normal results     PAP SMEAR: was last done 3/2017 with normal results          PAST MEDICAL HISTORY             CURRENT MEDICAL PROVIDERS:    Dentist: Joey    Oncologist: Larry    Ophthalmologist: Holly         Surgical History:         Tonsillectomy/Adenoidectomy      right mastectomy ;     Procedures:    Colonoscopy ( 2000/ and 4-15- 2013 normal )         Family History:     Father:  at age 82; Cause of death was CAD;  Peripheral Vascular Disease;  Type 2 Diabetes     Mother:  at age 81; Cause of death was cancer of pharynx/nasalpharyngeal         Social History:     Occupation: Homemaker     Marital Status:      Children: 1 child and 6 step children         Tobacco/Alcohol/Supplements:     Last Reviewed on 2019 11:16 AM by Sarah Rea    Tobacco: She has never smoked.          Alcohol:  Does not drink alcohol and never has.          Substance Abuse History:     Last Reviewed on 2015 07:43 PM by Manny Barber    None             Allergies:     Last Reviewed on 2019 10:35 AM by Niurka Rg    Benadryl:        Current Medications:     Last Reviewed on 2019 10:36 AM by Niurka Rg    Alendronate Sodium 70mg Tablet once a week     Benazepril HCl 10mg Tablet one a day     Levothyroxine Sodium 0.088mg Tablet Take 1 tablet(s) by mouth daily     Anastrozole 1mg Tablet Take 1 tablet(s) by mouth daily     Loratadine 10mg Tablet 1 tab daily prn     Vitamin B12 (Cyanocobalamin) 1,000mcg Tablet 1 tab daily  #90 (Ninety) tablet(s)     Vitamin D3 (Cholecalciferol Vitamin D3) 2,000IU Capsules 1 capsule daily.  #100 (One [place]) capsule(s)         OBJECTIVE:        Vitals:         Current: 2019 10:40:29 AM    Ht:  5 ft, 2 in;  Wt: 163.8 lbs;  BMI: 30.0    T: 97.7 F (oral);  BP: 193/58 mm Hg (left arm, sitting);  P: 49 bpm (left arm (BP Cuff), sitting);  sCr: 0.99 mg/dL;  GFR: 51.31        Repeat:     11:04:59 AM     BP:   172/82mm Hg        Exams:     PHYSICAL EXAM:     GENERAL: vital signs recorded - well developed, well  nourished;  no apparent distress;     EYES: PERRL, EOMI     E/N/T: EARS:  normal external auditory canals and tympanic membranes;  grossly normal hearing; OROPHARYNX:  normal mucosa, dentition, gingiva, and posterior pharynx;     NECK: carotid exam reveals no bruits;     RESPIRATORY: normal respiratory rate and pattern with no distress; normal breath sounds with no rales, rhonchi, wheezes or rubs;     CARDIOVASCULAR: normal rate; rhythm is regular;  a systolic murmur is noted: it is grade 3/6 and Character soft;  no edema;     GASTROINTESTINAL: nontender, nondistended; no hepatosplenomegaly or masses; no bruits;     MUSCULOSKELETAL: normal gait;     NEUROLOGICAL:  cranial nerves, motor and sensory function, reflexes, gait and coordination are all intact;     PSYCHIATRIC:  appropriate affect and demeanor; normal speech pattern; grossly normal memory;         ASSESSMENT           401.1   I10  Essential hypertension              DDx:     281.0   D51.0  Pernicious anemia              DDx:     585.2   N18.2  Chronic renal insufficiency, stage 2              DDx:     244.8   E03.9  Acquired hypothyroidism              DDx:     268.9   E55.9  Vitamin D deficiency, unspecified              DDx:     272.0   E78.1  Elevated cholesterol              DDx:     278.02   E66.3  Overweight              DDx:     296.21   M54.16  mild depression              DDx:         ORDERS:         Meds Prescribed:       Refill of: Alendronate Sodium 70mg Tablet once a week  #13 (Thirteen) tablet(s) Refills: 1       Refill of: Levothyroxine Sodium 0.088mg Tablet Take 1 tablet(s) by mouth daily  #90 (Ninety) tablet(s) Refills: 1       Benazepril HCl 10mg Tablet Take 1 tablet(s) by mouth bid  #180 (One [place] and Eighty) tablet(s) Refills: 0         Lab Orders:       00256  THYII - [facility] Thyroid panel with TSH (16880, 84207)  (Send-Out)         43802  VITD - [facility] Vitamin D, 25 Hydroxy  (Send-Out)         62227  VB12 - H Vitamin B12  (Send-Out)          28590  BDCB2 - [facility] CBC w/o diff  (Send-Out)         15216  COMP - [facility] Comp. Metabolic Panel  (Send-Out)           Other Orders:         Depression screen positive and follow up plan documented  (In-House)         1101F  Pt screen for fall risk; document no falls in past year or only 1 fall w/o injury in past year (KAMARI)  (In-House)                   PLAN:          Essential hypertension no HA/dizziness-she is stressed at home, her  has worsening dementia and her daughter has ALS high-will increase benazapril to 20 with 12.5 mg hctz     LABORATORY:  Labs ordered to be performed today include Comprehensive metabolic panel.            Prescriptions:       Refill of: Alendronate Sodium 70mg Tablet once a week  #13 (Thirteen) tablet(s) Refills: 1       Refill of: Levothyroxine Sodium 0.088mg Tablet Take 1 tablet(s) by mouth daily  #90 (Ninety) tablet(s) Refills: 1       Benazepril HCl 10mg Tablet Take 1 tablet(s) by mouth bid  #180 (One [place] and Eighty) tablet(s) Refills: 0           Orders:       20188  COMP - [facility] Comp. Metabolic Panel  (Send-Out)            Pernicious anemia stable on monthly b12 injections, will check labs     LABORATORY:  Labs ordered to be performed today include B12 and CBC W/O DIFF.            Orders:       45667  VB12 - H Vitamin B12  (Send-Out)         04978  BDCB2 - H CBC w/o diff  (Send-Out)            Chronic renal insufficiency, stage 2 due for labs          Acquired hypothyroidism stable on meds, due for labs     LABORATORY:  Labs ordered to be performed today include Thyroid Panel.            Orders:       15795  THYII - [facility] Thyroid panel with TSH (44741, 50656)  (Send-Out)            Vitamin D deficiency, unspecified on 2000 units a day     LABORATORY:  Labs ordered to be performed today include Vitamin D.            Orders:       02193  VITD - [facility] Vitamin D, 25 Hydroxy  (Send-Out)            Elevated cholesterol diet controlled, due for labs          Overweight  cont working on diet and exercise -she has lost 3#'s since her last OV          mild depression mild, she defers treatment with meds or counseling at this time     MIPS PHQ-9 Depression Screening: Completed form scanned and in chart; Total Score 6 Positive Depression Screen: mild and she defers treatment at this time, no suicidal ideation           Orders:         Depression screen positive and follow up plan documented  (In-House)         1101F  Pt screen for fall risk; document no falls in past year or only 1 fall w/o injury in past year (KAMARI)  (In-House)               CHARGE CAPTURE           **Please note: ICD descriptions below are intended for billing purposes only and may not represent clinical diagnoses**        Primary Diagnosis:         401.1 Essential hypertension            I10    Essential (primary) hypertension              Orders:          37722   Office/outpatient visit; established patient, level 4  (In-House)           281.0 Pernicious anemia            D51.0    Vitamin B12 deficiency anemia due to intrinsic factor deficiency    585.2 Chronic renal insufficiency, stage 2            N18.2    Chronic kidney disease, stage 2 (mild)    244.8 Acquired hypothyroidism            E03.9    Hypothyroidism, unspecified    268.9 Vitamin D deficiency, unspecified            E55.9    Vitamin D deficiency, unspecified    272.0 Elevated cholesterol            E78.1    Pure hyperglyceridemia    278.02 Overweight            E66.3    Overweight    296.21 mild depression            M54.16    Radiculopathy, lumbar region              Orders:             Depression screen positive and follow up plan documented  (In-House)             1101F   Pt screen for fall risk; document no falls in past year or only 1 fall w/o injury in past year (KAMARI)  (In-House)

## 2021-07-01 ENCOUNTER — TELEPHONE (OUTPATIENT)
Dept: FAMILY MEDICINE CLINIC | Age: 76
End: 2021-07-01

## 2021-07-01 VITALS
WEIGHT: 160.4 LBS | DIASTOLIC BLOOD PRESSURE: 74 MMHG | BODY MASS INDEX: 29.52 KG/M2 | TEMPERATURE: 99.1 F | HEIGHT: 62 IN | HEART RATE: 64 BPM | SYSTOLIC BLOOD PRESSURE: 132 MMHG

## 2021-07-01 VITALS
SYSTOLIC BLOOD PRESSURE: 172 MMHG | DIASTOLIC BLOOD PRESSURE: 82 MMHG | TEMPERATURE: 97.7 F | HEIGHT: 62 IN | HEART RATE: 49 BPM | BODY MASS INDEX: 30.14 KG/M2 | WEIGHT: 163.8 LBS

## 2021-07-01 VITALS
DIASTOLIC BLOOD PRESSURE: 65 MMHG | HEART RATE: 47 BPM | SYSTOLIC BLOOD PRESSURE: 160 MMHG | WEIGHT: 168 LBS | BODY MASS INDEX: 30.91 KG/M2 | TEMPERATURE: 98.2 F | HEIGHT: 62 IN

## 2021-07-01 VITALS
SYSTOLIC BLOOD PRESSURE: 152 MMHG | DIASTOLIC BLOOD PRESSURE: 55 MMHG | TEMPERATURE: 98 F | HEIGHT: 62 IN | WEIGHT: 164.2 LBS | HEART RATE: 50 BPM | BODY MASS INDEX: 30.22 KG/M2

## 2021-07-01 NOTE — TELEPHONE ENCOUNTER
Pt's daughter is calling and she is voicing concerns over her mother having memory issues I advised an appt with DR cat but also a short term work up to evaluate there are no open appt here so she is going to take her to urgent care to have a urine and a work up and will follow up here

## 2021-07-02 VITALS
WEIGHT: 156.2 LBS | DIASTOLIC BLOOD PRESSURE: 40 MMHG | HEART RATE: 49 BPM | BODY MASS INDEX: 28.74 KG/M2 | TEMPERATURE: 98.1 F | HEIGHT: 62 IN | SYSTOLIC BLOOD PRESSURE: 131 MMHG

## 2021-07-02 VITALS
BODY MASS INDEX: 29.19 KG/M2 | WEIGHT: 158.6 LBS | DIASTOLIC BLOOD PRESSURE: 48 MMHG | TEMPERATURE: 97.7 F | HEART RATE: 49 BPM | SYSTOLIC BLOOD PRESSURE: 145 MMHG | HEIGHT: 62 IN

## 2021-07-02 VITALS
HEART RATE: 54 BPM | DIASTOLIC BLOOD PRESSURE: 55 MMHG | HEIGHT: 62 IN | BODY MASS INDEX: 25.43 KG/M2 | TEMPERATURE: 95.9 F | WEIGHT: 138.2 LBS | SYSTOLIC BLOOD PRESSURE: 150 MMHG

## 2021-07-21 ENCOUNTER — LAB (OUTPATIENT)
Dept: LAB | Facility: HOSPITAL | Age: 76
End: 2021-07-21

## 2021-07-21 ENCOUNTER — OFFICE VISIT (OUTPATIENT)
Dept: FAMILY MEDICINE CLINIC | Age: 76
End: 2021-07-21

## 2021-07-21 VITALS
WEIGHT: 136.2 LBS | DIASTOLIC BLOOD PRESSURE: 45 MMHG | SYSTOLIC BLOOD PRESSURE: 133 MMHG | BODY MASS INDEX: 25.06 KG/M2 | TEMPERATURE: 98 F | HEART RATE: 45 BPM | HEIGHT: 62 IN

## 2021-07-21 DIAGNOSIS — R41.3 MEMORY LOSS: ICD-10-CM

## 2021-07-21 DIAGNOSIS — R41.3 MEMORY LOSS: Primary | ICD-10-CM

## 2021-07-21 DIAGNOSIS — E55.9 VITAMIN D DEFICIENCY: ICD-10-CM

## 2021-07-21 DIAGNOSIS — I10 ESSENTIAL HYPERTENSION: ICD-10-CM

## 2021-07-21 DIAGNOSIS — R82.90 URINE ABNORMALITY: ICD-10-CM

## 2021-07-21 DIAGNOSIS — R00.1 BRADYCARDIA: ICD-10-CM

## 2021-07-21 DIAGNOSIS — D51.8 OTHER VITAMIN B12 DEFICIENCY ANEMIA: ICD-10-CM

## 2021-07-21 DIAGNOSIS — R53.83 FATIGUE, UNSPECIFIED TYPE: ICD-10-CM

## 2021-07-21 DIAGNOSIS — E03.9 ACQUIRED HYPOTHYROIDISM: ICD-10-CM

## 2021-07-21 DIAGNOSIS — Z00.00 MEDICARE ANNUAL WELLNESS VISIT, SUBSEQUENT: ICD-10-CM

## 2021-07-21 DIAGNOSIS — F32.0 CURRENT MILD EPISODE OF MAJOR DEPRESSIVE DISORDER WITHOUT PRIOR EPISODE (HCC): ICD-10-CM

## 2021-07-21 LAB
ALBUMIN SERPL-MCNC: 4 G/DL (ref 3.5–5.2)
ALBUMIN/GLOB SERPL: 1.2 G/DL
ALP SERPL-CCNC: 86 U/L (ref 39–117)
ALT SERPL W P-5'-P-CCNC: 15 U/L (ref 1–33)
ANION GAP SERPL CALCULATED.3IONS-SCNC: 9.8 MMOL/L (ref 5–15)
AST SERPL-CCNC: 21 U/L (ref 1–32)
BASOPHILS # BLD AUTO: 0.02 10*3/MM3 (ref 0–0.2)
BASOPHILS NFR BLD AUTO: 0.4 % (ref 0–1.5)
BILIRUB SERPL-MCNC: 0.4 MG/DL (ref 0–1.2)
BILIRUB UR QL STRIP: NEGATIVE
BUN SERPL-MCNC: 13 MG/DL (ref 8–23)
BUN/CREAT SERPL: 13.8 (ref 7–25)
CALCIUM SPEC-SCNC: 9.5 MG/DL (ref 8.6–10.5)
CHLORIDE SERPL-SCNC: 104 MMOL/L (ref 98–107)
CLARITY UR: CLEAR
CO2 SERPL-SCNC: 25.2 MMOL/L (ref 22–29)
COLOR UR: YELLOW
CREAT SERPL-MCNC: 0.94 MG/DL (ref 0.57–1)
DEPRECATED RDW RBC AUTO: 44.3 FL (ref 37–54)
EOSINOPHIL # BLD AUTO: 0.13 10*3/MM3 (ref 0–0.4)
EOSINOPHIL NFR BLD AUTO: 2.6 % (ref 0.3–6.2)
ERYTHROCYTE [DISTWIDTH] IN BLOOD BY AUTOMATED COUNT: 13.9 % (ref 12.3–15.4)
GFR SERPL CREATININE-BSD FRML MDRD: 58 ML/MIN/1.73
GLOBULIN UR ELPH-MCNC: 3.3 GM/DL
GLUCOSE SERPL-MCNC: 76 MG/DL (ref 65–99)
GLUCOSE UR STRIP-MCNC: NEGATIVE MG/DL
HCT VFR BLD AUTO: 40.4 % (ref 34–46.6)
HGB BLD-MCNC: 13.4 G/DL (ref 12–15.9)
HGB UR QL STRIP.AUTO: NEGATIVE
IMM GRANULOCYTES # BLD AUTO: 0.01 10*3/MM3 (ref 0–0.05)
IMM GRANULOCYTES NFR BLD AUTO: 0.2 % (ref 0–0.5)
KETONES UR QL STRIP: NEGATIVE
LEUKOCYTE ESTERASE UR QL STRIP.AUTO: NEGATIVE
LYMPHOCYTES # BLD AUTO: 1.47 10*3/MM3 (ref 0.7–3.1)
LYMPHOCYTES NFR BLD AUTO: 29.8 % (ref 19.6–45.3)
MCH RBC QN AUTO: 28.8 PG (ref 26.6–33)
MCHC RBC AUTO-ENTMCNC: 33.2 G/DL (ref 31.5–35.7)
MCV RBC AUTO: 86.7 FL (ref 79–97)
MONOCYTES # BLD AUTO: 0.46 10*3/MM3 (ref 0.1–0.9)
MONOCYTES NFR BLD AUTO: 9.3 % (ref 5–12)
NEUTROPHILS NFR BLD AUTO: 2.85 10*3/MM3 (ref 1.7–7)
NEUTROPHILS NFR BLD AUTO: 57.7 % (ref 42.7–76)
NITRITE UR QL STRIP: NEGATIVE
PH UR STRIP.AUTO: 5.5 [PH] (ref 5–8)
PLATELET # BLD AUTO: 226 10*3/MM3 (ref 140–450)
PMV BLD AUTO: 9.9 FL (ref 6–12)
POTASSIUM SERPL-SCNC: 4 MMOL/L (ref 3.5–5.2)
PROT SERPL-MCNC: 7.3 G/DL (ref 6–8.5)
PROT UR QL STRIP: NEGATIVE
RBC # BLD AUTO: 4.66 10*6/MM3 (ref 3.77–5.28)
SODIUM SERPL-SCNC: 139 MMOL/L (ref 136–145)
SP GR UR STRIP: 1.01 (ref 1–1.03)
TSH SERPL DL<=0.05 MIU/L-ACNC: 3.83 UIU/ML (ref 0.27–4.2)
UROBILINOGEN UR QL STRIP: NORMAL
VIT B12 BLD-MCNC: 690 PG/ML (ref 211–946)
WBC # BLD AUTO: 4.94 10*3/MM3 (ref 3.4–10.8)

## 2021-07-21 PROCEDURE — 85025 COMPLETE CBC W/AUTO DIFF WBC: CPT

## 2021-07-21 PROCEDURE — 93000 ELECTROCARDIOGRAM COMPLETE: CPT | Performed by: FAMILY MEDICINE

## 2021-07-21 PROCEDURE — 99214 OFFICE O/P EST MOD 30 MIN: CPT | Performed by: FAMILY MEDICINE

## 2021-07-21 PROCEDURE — 84443 ASSAY THYROID STIM HORMONE: CPT

## 2021-07-21 PROCEDURE — 82607 VITAMIN B-12: CPT

## 2021-07-21 PROCEDURE — 1159F MED LIST DOCD IN RCRD: CPT | Performed by: FAMILY MEDICINE

## 2021-07-21 PROCEDURE — 36415 COLL VENOUS BLD VENIPUNCTURE: CPT

## 2021-07-21 PROCEDURE — G0439 PPPS, SUBSEQ VISIT: HCPCS | Performed by: FAMILY MEDICINE

## 2021-07-21 PROCEDURE — 82652 VIT D 1 25-DIHYDROXY: CPT

## 2021-07-21 PROCEDURE — 81003 URINALYSIS AUTO W/O SCOPE: CPT

## 2021-07-21 PROCEDURE — 80053 COMPREHEN METABOLIC PANEL: CPT

## 2021-07-21 PROCEDURE — 1170F FXNL STATUS ASSESSED: CPT | Performed by: FAMILY MEDICINE

## 2021-07-21 RX ORDER — MULTIPLE VITAMINS W/ MINERALS TAB 9MG-400MCG
1 TAB ORAL DAILY
COMMUNITY
End: 2022-07-25

## 2021-07-21 RX ORDER — ESCITALOPRAM OXALATE 5 MG/1
5 TABLET ORAL DAILY
Qty: 30 TABLET | Refills: 2 | Status: SHIPPED | OUTPATIENT
Start: 2021-07-21 | End: 2022-01-21

## 2021-07-21 RX ORDER — LEVOTHYROXINE SODIUM 88 UG/1
88 TABLET ORAL DAILY
COMMUNITY
Start: 2021-05-14 | End: 2021-09-20 | Stop reason: SDUPTHER

## 2021-07-21 RX ORDER — ESCITALOPRAM OXALATE 5 MG/1
5 TABLET ORAL DAILY
Qty: 30 TABLET | Refills: 2 | Status: SHIPPED | OUTPATIENT
Start: 2021-07-21 | End: 2021-07-21

## 2021-07-21 RX ORDER — BENAZEPRIL HYDROCHLORIDE 10 MG/1
10 TABLET ORAL 2 TIMES DAILY
COMMUNITY
Start: 2021-05-17 | End: 2021-09-20 | Stop reason: SDUPTHER

## 2021-07-26 ENCOUNTER — TELEPHONE (OUTPATIENT)
Dept: FAMILY MEDICINE CLINIC | Age: 76
End: 2021-07-26

## 2021-07-26 LAB — 1,25(OH)2D SERPL-MCNC: 56.6 PG/ML (ref 19.9–79.3)

## 2021-08-03 ENCOUNTER — OFFICE VISIT (OUTPATIENT)
Dept: CARDIOLOGY | Facility: CLINIC | Age: 76
End: 2021-08-03

## 2021-08-03 VITALS
BODY MASS INDEX: 25.03 KG/M2 | SYSTOLIC BLOOD PRESSURE: 148 MMHG | DIASTOLIC BLOOD PRESSURE: 58 MMHG | WEIGHT: 136 LBS | HEIGHT: 62 IN | HEART RATE: 50 BPM

## 2021-08-03 DIAGNOSIS — I10 ESSENTIAL HYPERTENSION: ICD-10-CM

## 2021-08-03 DIAGNOSIS — R00.1 BRADYCARDIA: Primary | ICD-10-CM

## 2021-08-03 PROCEDURE — 99203 OFFICE O/P NEW LOW 30 MIN: CPT | Performed by: INTERNAL MEDICINE

## 2021-08-03 NOTE — ASSESSMENT & PLAN NOTE
Patient with sinus bradycardia on her EKG and no overt symptoms. She appears to be doing all her ADLs without any functional limitations. This is likely just normal variant if develops any increased fatigability or limiting shortness of breath issues then will recommend proceeding with 24-hour Holter and treadmill stress test to evaluate heart rate response to activity

## 2021-08-03 NOTE — ASSESSMENT & PLAN NOTE
Borderline elevation today may be some whitecoat related recommended keep a home blood pressure log

## 2021-08-03 NOTE — PROGRESS NOTES
Chief Complaint  Slow Heart Rate      History of Present Illness  Eileen Plascencia presents to [facility] CARDIOLOGY  Patient is a 76-year-old female with essential hypertension and hypothyroidism who originally went to see her family doctor due to memory issues. The patient was noted at times at times bradycardia and the rate in the 40s she does not report any syncopal or presyncopal episodes. She does feel that she a low heart rate all her life but does not remember her specific pulse rate normally. Patient has not been experiencing any increased dyspnea on exertion or more fatigue issues. She still does walk several times per week around her property.    Past Medical History:   Diagnosis Date   • Abnormal weight loss    • Allergies     BENADRYL   MODERATE   • AR (allergic rhinitis)     UNSPECIFIED   • Asymptomatic menopausal state    • Bradycardia 8/3/2021   • Bradycardia, unspecified    • Breast cancer (CMS/HCC) 12/2014    NO CHEMO OR RADIATION   • Cardiac murmur, unspecified    • Cervicalgia    • Contusion of elbow and forearm, left, sequela    • Essential (primary) hypertension    • Essential hypertension 8/3/2021   • Hypertension    • Hypothyroidism, unspecified    • Major depressive disorder, single episode, mild (CMS/HCC)    • Nonrheumatic aortic valve disorder, unspecified    • Overweight    • Personal history of malignant neoplasm of breast    • Polyp of nasal cavity    • Rheumatic mitral stenosis    • Unspecified fall, subsequent encounter    • Vitamin B12 deficiency anemia due to intrinsic factor deficiency    • Vitamin D deficiency, unspecified          Current Outpatient Medications:   •  benazepril (LOTENSIN) 10 MG tablet, Take 10 mg by mouth 2 (Two) Times a Day., Disp: , Rfl:   •  Cyanocobalamin (VITAMIN B12 PO), Take  by mouth., Disp: , Rfl:   •  escitalopram (Lexapro) 5 MG tablet, Take 1 tablet by mouth Daily., Disp: 30 tablet, Rfl: 2  •  levothyroxine (SYNTHROID, LEVOTHROID)  "88 MCG tablet, Take 88 mcg by mouth Daily., Disp: , Rfl:   •  VITAMIN D PO, Take  by mouth Daily., Disp: , Rfl:   •  multivitamin with minerals (VITAMIN D3 COMPLETE PO), Take 1 tablet by mouth Daily., Disp: , Rfl:     There are no discontinued medications.  No Known Allergies     Social History     Tobacco Use   • Smoking status: Never Smoker   • Smokeless tobacco: Never Used   Vaping Use   • Vaping Use: Never used   Substance Use Topics   • Alcohol use: Never   • Drug use: Never       Family History   Problem Relation Age of Onset   • Cancer Mother    • Coronary artery disease Father    • Peripheral vascular disease Father    • Diabetes type II Father         Objective     /58   Pulse 50   Ht 157.5 cm (62\")   Wt 61.7 kg (136 lb)   BMI 24.87 kg/m²       Physical Exam  Constitutional:       General: He is awake. He is not in acute distress.     Appearance: Normal appearance.   Neck:      Vascular: No carotid bruit, hepatojugular reflux or JVD.   Cardiovascular:      Rate and Rhythm: Normal rate and regular rhythm.      Chest Wall: PMI is not displaced.      Heart sounds: Normal heart sounds, S1 normal and S2 normal. No murmur heard.   No friction rub. No gallop. No S3 or S4 sounds.    Pulmonary:      Effort: Pulmonary effort is normal.      Breath sounds: Normal breath sounds. No wheezing, rhonchi or rales.   Ext.      Right lower leg: No edema.      Left lower leg: No edema.   Skin:     General: Skin is warm and dry.      Coloration: Skin is not cyanotic.      Findings: No petechiae or rash.   Neurological:      Mental Status: He is alert.   Psychiatric:         Behavior: Behavior is cooperative.       Result Review :     No results found for: PROBNP  CMP    CMP 7/21/21   Glucose 76   BUN 13   Creatinine 0.94   eGFR Non African Am 58 (A)   Sodium 139   Potassium 4.0   Chloride 104   Calcium 9.5   Albumin 4.00   Total Bilirubin 0.4   Alkaline Phosphatase 86   AST (SGOT) 21   ALT (SGPT) 15   (A) Abnormal " value            CBC w/diff    CBC w/Diff 7/21/21   WBC 4.94   RBC 4.66   Hemoglobin 13.4   Hematocrit 40.4   MCV 86.7   MCH 28.8   MCHC 33.2   RDW 13.9   Platelets 226   Neutrophil Rel % 57.7   Immature Granulocyte Rel % 0.2   Lymphocyte Rel % 29.8   Monocyte Rel % 9.3   Eosinophil Rel % 2.6   Basophil Rel % 0.4            Lab Results   Component Value Date    TSH 3.830 07/21/2021      Lab Results   Component Value Date    FREET4 1.7 09/11/2019                No results found for this or any previous visit.   EKG 7/21/2021  Sinus bradycardia  Echocardiogram 7/15/2019  Normal EF 60%  Aortic insufficiency mild to moderate  Calcified mitral valve  Mild tricuspid rotation with mild pulmonary tension          Diagnoses and all orders for this visit:    1. Bradycardia (Primary)  Assessment & Plan:  Patient with sinus bradycardia on her EKG and no overt symptoms. She appears to be doing all her ADLs without any functional limitations. This is likely just normal variant if develops any increased fatigability or limiting shortness of breath issues then will recommend proceeding with 24-hour Holter and treadmill stress test to evaluate heart rate response to activity      2. Essential hypertension  Assessment & Plan:  Borderline elevation today may be some whitecoat related recommended keep a home blood pressure log            Follow Up     Return in about 1 year (around 8/3/2022), or if symptoms worsen or fail to improve, for EKG with F/U.    Patient was given instructions and counseling regarding her condition or for health maintenance advice. Please see specific information pulled into the AVS if appropriate.

## 2021-08-30 NOTE — PROGRESS NOTES
Procedure     ECG 12 Lead    Date/Time: 8/30/2021 5:48 PM  Performed by: Sarah Rea MD  Authorized by: Sarah Rea MD   Previous ECG: no previous ECG available  Rhythm: sinus rhythm  Rate: bradycardic  Conduction: conduction normal  QRS axis: normal    Clinical impression: non-specific ECG

## 2021-09-20 ENCOUNTER — OFFICE VISIT (OUTPATIENT)
Dept: FAMILY MEDICINE CLINIC | Age: 76
End: 2021-09-20

## 2021-09-20 VITALS
SYSTOLIC BLOOD PRESSURE: 135 MMHG | WEIGHT: 136.2 LBS | DIASTOLIC BLOOD PRESSURE: 63 MMHG | HEART RATE: 48 BPM | BODY MASS INDEX: 25.06 KG/M2 | TEMPERATURE: 97.5 F | HEIGHT: 62 IN

## 2021-09-20 DIAGNOSIS — I10 ESSENTIAL HYPERTENSION: ICD-10-CM

## 2021-09-20 DIAGNOSIS — R41.3 MEMORY LOSS: Primary | ICD-10-CM

## 2021-09-20 DIAGNOSIS — E03.9 ACQUIRED HYPOTHYROIDISM: ICD-10-CM

## 2021-09-20 PROCEDURE — 99214 OFFICE O/P EST MOD 30 MIN: CPT | Performed by: FAMILY MEDICINE

## 2021-09-20 RX ORDER — BENAZEPRIL HYDROCHLORIDE 10 MG/1
10 TABLET ORAL 2 TIMES DAILY
Qty: 180 TABLET | Refills: 1 | Status: SHIPPED | OUTPATIENT
Start: 2021-09-20 | End: 2022-04-12 | Stop reason: SDUPTHER

## 2021-09-20 RX ORDER — LEVOTHYROXINE SODIUM 88 UG/1
88 TABLET ORAL DAILY
Qty: 90 TABLET | Refills: 1 | Status: SHIPPED | OUTPATIENT
Start: 2021-09-20 | End: 2021-11-01

## 2021-09-20 NOTE — PROGRESS NOTES
Eileen Plascencia presents to [facility].    Chief Complaint: memory follow up    Subjective       History of Present Illness:  XUAN Gray is in today with family concerns of memory loss, forgetfullness and being unaware of her conversation.  Onset years ago but worse the last 3 weeks, and she has moved next door to her daughter  And her daughter has noticed it since she has been living next door, in addition, her  just  in April and she is also sad and grieving over this.  In addition, her  was sick and had delusions/hallucinations and she was his primary care giver and this was hard. Hearing is ok.  Her depression is ongoing and she decided not to try lexapro.She doesn't think she is depressed.       Review of Systems:  Review of Systems     CONSTITUTIONAL:  Negative for chills, fatigue and fever.      EYES:  Negative for blurred vision, eye pain, and photophobia.      E/N/T:  Negative for hearing problems, E/N/T pain, congestion, rhinorrhea, epistaxis, hoarseness, and dental problems.      CARDIOVASCULAR:  Negative for chest pain, palpitations, tachycardia, orthopnea, and edema.      RESPIRATORY:  Negative for cough, dyspnea, and hemoptysis.      GASTROINTESTINAL:  Negative for abdominal pain, heartburn, constipation, diarrhea, and stool changes.      GENITOURINARY:  Negative for urinary incontinence.      MUSCULOSKELETAL:  Negative for back pain.      INTEGUMENTARY/BREAST:  Negative for atypical moles, dry skin, pruritis, rashes, breast masses, and nipple discharge.      NEUROLOGICAL:  Negative for dizziness, headaches, paresthesias, and weakness.      PSYCHIATRIC:  Positive for feelings of stress and hypersomnia; sleeping more during the day.   Negative for anxiety or depression.      Objective   Medical History:  Past Medical History:   • Abnormal weight loss   • Allergies    BENADRYL   MODERATE   • AR (allergic rhinitis)    UNSPECIFIED   • Asymptomatic  menopausal state   • Bradycardia   • Bradycardia, unspecified   • Breast cancer (CMS/HCC)    NO CHEMO OR RADIATION   • Cardiac murmur, unspecified   • Cervicalgia   • Contusion of elbow and forearm, left, sequela   • Essential (primary) hypertension   • Essential hypertension   • Hypertension   • Hypothyroidism, unspecified   • Major depressive disorder, single episode, mild (CMS/HCC)   • Nonrheumatic aortic valve disorder, unspecified   • Overweight   • Personal history of malignant neoplasm of breast   • Polyp of nasal cavity   • Rheumatic mitral stenosis   • Unspecified fall, subsequent encounter   • Vitamin B12 deficiency anemia due to intrinsic factor deficiency   • Vitamin D deficiency, unspecified     Past Surgical History:   • COLONOSCOPY   • MASTECTOMY   • TONSILLECTOMY AND ADENOIDECTOMY      Family History   Problem Relation Age of Onset   • Cancer Mother    • Coronary artery disease Father    • Peripheral vascular disease Father    • Diabetes type II Father      Social History     Tobacco Use   • Smoking status: Never Smoker   • Smokeless tobacco: Never Used   Substance Use Topics   • Alcohol use: Never       Health Maintenance Due   Topic Date Due   • COLORECTAL CANCER SCREENING  Never done   • ZOSTER VACCINE (1 of 2) Never done   • HEPATITIS C SCREENING  Never done   • LIPID PANEL  07/21/2021        Immunization History   Administered Date(s) Administered   • COVID-19 (PFIZER) 04/28/2021, 05/19/2021   • Fluzone High Dose =>65 Years ([employer] ONLY) 09/23/2016, 09/12/2017   • Fluzone High-Dose 65+yrs 08/18/2020   • Influenza, Unspecified 09/30/2019   • Pneumococcal Conjugate 13-Valent (PCV13) 03/04/2016, 08/18/2020   • Pneumococcal Polysaccharide (PPSV23) 04/01/2013   • Tdap 09/19/2018       No Known Allergies     Medications:  Current Outpatient Medications on File Prior to Visit   Medication Sig   • Cyanocobalamin (VITAMIN B12 PO) Take  by mouth.   • multivitamin with minerals (VITAMIN D3 COMPLETE PO)  "Take 1 tablet by mouth Daily.   • VITAMIN D PO Take  by mouth Daily.   • [DISCONTINUED] benazepril (LOTENSIN) 10 MG tablet Take 10 mg by mouth 2 (Two) Times a Day.   • [DISCONTINUED] levothyroxine (SYNTHROID, LEVOTHROID) 88 MCG tablet Take 88 mcg by mouth Daily.   • escitalopram (Lexapro) 5 MG tablet Take 1 tablet by mouth Daily.     No current facility-administered medications on file prior to visit.       Vital Signs:   /63 (BP Location: Left arm, Patient Position: Sitting, Cuff Size: Adult)   Pulse (!) 48   Temp 97.5 °F (36.4 °C) (Oral)   Ht 157.5 cm (62\")   Wt 61.8 kg (136 lb 3.2 oz)   BMI 24.91 kg/m²       Physical Exam:  Physical Exam  Vitals and nursing note reviewed.   Constitutional:       General: She is not in acute distress.     Appearance: Normal appearance. She is not ill-appearing, toxic-appearing or diaphoretic.   HENT:      Head: Normocephalic and atraumatic.      Mouth/Throat:      Mouth: Mucous membranes are moist.   Eyes:      Extraocular Movements: Extraocular movements intact.      Conjunctiva/sclera: Conjunctivae normal.   Cardiovascular:      Rate and Rhythm: Normal rate and regular rhythm.      Heart sounds: Normal heart sounds.   Pulmonary:      Breath sounds: Normal breath sounds. No wheezing, rhonchi or rales.   Abdominal:      General: Abdomen is flat.      Palpations: Abdomen is soft.   Musculoskeletal:      Cervical back: Neck supple. No rigidity.   Lymphadenopathy:      Cervical: No cervical adenopathy.   Skin:     General: Skin is warm and dry.   Neurological:      Mental Status: She is alert and oriented to person, place, and time.   Psychiatric:         Mood and Affect: Mood normal.         Behavior: Behavior normal.         Result Review      The following data was reviewed by Sarah Rea MD on 09/20/2021.  Lab Results   Component Value Date    WBC 4.94 07/21/2021    HGB 13.4 07/21/2021    HCT 40.4 07/21/2021    MCV 86.7 07/21/2021     07/21/2021     Lab " Results   Component Value Date    GLUCOSE 76 07/21/2021    BUN 13 07/21/2021    CREATININE 0.94 07/21/2021    EGFRIFNONA 58 (L) 07/21/2021    BCR 13.8 07/21/2021    K 4.0 07/21/2021    CO2 25.2 07/21/2021    CALCIUM 9.5 07/21/2021    ALBUMIN 4.00 07/21/2021    LABIL2 1.1 (L) 03/02/2020    AST 21 07/21/2021    ALT 15 07/21/2021     Lab Results   Component Value Date    CHLPL 176 03/18/2019    TRIG 78 03/18/2019    HDL 49 03/18/2019     (H) 03/18/2019     Lab Results   Component Value Date    TSH 3.830 07/21/2021     No results found for: HGBA1C  No results found for: PSA                    Assessment and Plan:          Diagnoses and all orders for this visit:    1. Memory loss (Primary)  Comments:  Vit D and B12 were normal, MME score 27/30-will refer to neurology for further work up of short term memory loss  Orders:  -     Ambulatory Referral to Neurology    2. Acquired hypothyroidism  Comments:  TSH reviewed and WNL, meds refilled today  Orders:  -     levothyroxine (SYNTHROID, LEVOTHROID) 88 MCG tablet; Take 1 tablet by mouth Daily.  Dispense: 90 tablet; Refill: 1    3. Essential hypertension  Comments:  stable on meds, meds refilled, labs reviewed.  Orders:  -     benazepril (LOTENSIN) 10 MG tablet; Take 1 tablet by mouth 2 (Two) Times a Day.  Dispense: 180 tablet; Refill: 1          Follow Up   No follow-ups on file.

## 2021-10-30 DIAGNOSIS — E03.9 ACQUIRED HYPOTHYROIDISM: ICD-10-CM

## 2021-11-01 ENCOUNTER — OFFICE VISIT (OUTPATIENT)
Dept: NEUROLOGY | Facility: CLINIC | Age: 76
End: 2021-11-01

## 2021-11-01 VITALS
HEART RATE: 46 BPM | HEIGHT: 62 IN | DIASTOLIC BLOOD PRESSURE: 50 MMHG | SYSTOLIC BLOOD PRESSURE: 157 MMHG | BODY MASS INDEX: 24.66 KG/M2 | WEIGHT: 134 LBS

## 2021-11-01 DIAGNOSIS — G31.84 MILD COGNITIVE IMPAIRMENT WITH MEMORY LOSS: Primary | ICD-10-CM

## 2021-11-01 PROCEDURE — 99204 OFFICE O/P NEW MOD 45 MIN: CPT | Performed by: PSYCHIATRY & NEUROLOGY

## 2021-11-01 RX ORDER — LEVOTHYROXINE SODIUM 88 UG/1
TABLET ORAL
Qty: 90 TABLET | Refills: 0 | Status: SHIPPED | OUTPATIENT
Start: 2021-11-01 | End: 2022-02-01

## 2021-11-01 NOTE — ASSESSMENT & PLAN NOTE
Discussed with her and her daughter that she has mild cognitive impairment with memory loss and he may be leading to early Alzheimer's disease.  It is possible that there is also depression associated with this therefore she is to take her antidepressant medication.  Her daughter is to supervise her with her medication.  She is to take a 's evaluation.  I would recommend for her not to drive unless she takes a 's evaluation.  I will do an MRI of the brain and they are to find out the results otherwise I will see her again in 2 months time for follow-up.

## 2021-11-01 NOTE — PROGRESS NOTES
"Chief Complaint  Memory Loss    Subjective          Eileen Plascencia is a 76 y.o. female who presents to [facility]  History of Present Illness  76-year-old woman evaluated for memory loss.  Her daughter is with her today.  Her daughter noticed that she is forgetful in the last year.  It happened last year when she moved next to her daughter.  Apparently her  has been violent to her and was getting demented and was transferred to a nursing home.  He  in April of this year.  Patient states that she is not depressed.  The daughter states that the patient is forgetful with her medications.  She has word finding difficulties.  At times she has difficulty remembering the names of her 2 grandchildren.  At times she repeats herself.  She has also at times ask again what was told to her earlier.  She is independent with activities of daily living.  She is driving.  She was given escitalopram which she did not get filled.  He had further work-up including TSH and B12 as well as comprehensive medical profile which is unremarkable.  There is no family history of Alzheimer's disease.    Objective   Vital Signs:   /50 (BP Location: Left arm, Patient Position: Sitting, Cuff Size: Adult)   Pulse (!) 46   Ht 157.5 cm (62.01\")   Wt 60.8 kg (134 lb)   BMI 24.50 kg/m²     Physical Exam   She is alert, fluent, phasic, follows commands well.  Her MoCA score is 20 out of 30.  Copying cube is impaired, sequencing is abnormal her clock drawing is normal.  Visuospatial executive 3 out of 5, naming 2 out of 3, attention 2 out of 2, 1 out of 1, 3 out of 3, language 1 out of 2, 0 out of 1, abstraction 2 out of 2, delayed recall 0 out of 5, orientation 6 out of 6 total of 20/30.  Her Mini-Mental score is 25 out of 30.  She missed the county, 3 out of 3 recent objects, 1 out of 5 spelling world backwards.  She cannot tell me the president [place].  She can tell me the " previous president.  She cannot tell me initially her daughters birth year.  She cannot give me the year she was remarried.  She did tell me that she was working for [employer] over 30 years ago when she retired in 1994.  She used to work in [place] and in [place].  She has 2 grandchildren.  She cannot tell me their birthdays.  She can tell me their names.  She can tell me their ages.  She had a hard time remembering previous resection and I told her to remember.  Visual fields full confrontation, EMs follow directions gaze, facial strength is full, soft palate elevation and tongue are normal.  There is no weakness of the upper or lower extremities.  Station gait is able to tiptoe, heel walk, connie without difficulty.  Heart is regular in rhythm, bradycardic.      Assessment and Plan  Diagnoses and all orders for this visit:    1. Mild cognitive impairment with memory loss (Primary)  Assessment & Plan:  Discussed with her and her daughter that she has mild cognitive impairment with memory loss and he may be leading to early Alzheimer's disease.  It is possible that there is also depression associated with this therefore she is to take her antidepressant medication.  Her daughter is to supervise her with her medication.  She is to take a 's evaluation.  I would recommend for her not to drive unless she takes a 's evaluation.  I will do an MRI of the brain and they are to find out the results otherwise I will see her again in 2 months time for follow-up.         Total time spent with the patient and coordinating patient care was 50 minutes.    Follow Up  No follow-ups on file.  Patient was given instructions and counseling regarding her condition or for health maintenance advice. Please see specific information pulled into the AVS if appropriate.

## 2021-11-18 ENCOUNTER — HOSPITAL ENCOUNTER (OUTPATIENT)
Dept: MRI IMAGING | Facility: HOSPITAL | Age: 76
Discharge: HOME | End: 2021-11-18
Admitting: PSYCHIATRY & NEUROLOGY

## 2021-11-18 DIAGNOSIS — G31.84 MILD COGNITIVE IMPAIRMENT WITH MEMORY LOSS: ICD-10-CM

## 2021-11-18 PROCEDURE — 70551 MRI BRAIN STEM W/O DYE: CPT

## 2021-11-24 DIAGNOSIS — I10 ESSENTIAL HYPERTENSION: ICD-10-CM

## 2021-11-24 RX ORDER — BENAZEPRIL HYDROCHLORIDE 10 MG/1
TABLET ORAL
Qty: 180 TABLET | Refills: 0 | OUTPATIENT
Start: 2021-11-24

## 2022-01-03 ENCOUNTER — OFFICE VISIT (OUTPATIENT)
Dept: NEUROLOGY | Facility: CLINIC | Age: 77
End: 2022-01-03

## 2022-01-03 VITALS
WEIGHT: 130 LBS | SYSTOLIC BLOOD PRESSURE: 144 MMHG | DIASTOLIC BLOOD PRESSURE: 43 MMHG | HEIGHT: 62 IN | BODY MASS INDEX: 23.92 KG/M2 | HEART RATE: 44 BPM

## 2022-01-03 DIAGNOSIS — F02.80 ALZHEIMER'S DISEASE: Primary | ICD-10-CM

## 2022-01-03 DIAGNOSIS — G30.9 ALZHEIMER'S DISEASE: Primary | ICD-10-CM

## 2022-01-03 PROBLEM — G31.84 MILD COGNITIVE IMPAIRMENT WITH MEMORY LOSS: Status: RESOLVED | Noted: 2021-11-01 | Resolved: 2022-01-03

## 2022-01-03 PROCEDURE — 99215 OFFICE O/P EST HI 40 MIN: CPT | Performed by: PSYCHIATRY & NEUROLOGY

## 2022-01-03 RX ORDER — MEMANTINE HYDROCHLORIDE 10 MG/1
TABLET ORAL
Qty: 60 TABLET | Refills: 5 | Status: SHIPPED | OUTPATIENT
Start: 2022-01-03 | End: 2022-04-12 | Stop reason: SDDI

## 2022-01-03 RX ORDER — MEMANTINE HYDROCHLORIDE 5 MG/1
TABLET ORAL
Qty: 28 TABLET | Refills: 0 | Status: SHIPPED | OUTPATIENT
Start: 2022-01-03 | End: 2022-04-12

## 2022-01-03 NOTE — PROGRESS NOTES
"Chief Complaint  No chief complaint on file.    Subjective          Eileen Plascencia is a 76 y.o. female who presents to [facility]  History of Present Illness  76-year-old woman here for follow-up for memory problems.  Her daughter is with her.  The patient is not as depressed with her mood being better during Jayne time and she was decorating however her memory was unchanged.  She was taking escitalopram which she ran out.  She was never depressed to the point where she had problems with her activities of daily living while her daughter was growing up and she was able to work without absences.  She has never been hospitalized or treated for depression in the past.    I reviewed with him the MRI of the brain and showed him the images.  Objective   Vital Signs:   /43   Pulse (!) 44   Ht 157.5 cm (62.01\")   Wt 59 kg (130 lb)   BMI 23.77 kg/m²     Physical Exam   She is alert, fluent, phasic, follows commands well.  Her Mini-Mental Status score is 27 out of 30.  She missed 3 out of 3 recent objects.  I had to repeat these 3 objects and made it get through a sentence and she still had difficult time until her fourth attempt in which she had the 3 words correctly and the sentence that she made correctly as well.  She had difficulty tell me the president [place] and the previous president.  She cannot tell me what she ate for dinner last night in which her daughter fix for her and was left in the refrigerator.  She states that she does not remember what she ate for dinner.  She tells us what she ate for breakfast however her daughter does not know what she ate for breakfast.  She told her daughter that she did not eat lunch.  Heart is significantly bradycardic.        Assessment and Plan  Diagnoses and all orders for this visit:    1. Alzheimer's disease (HCC) (Primary)  Assessment & Plan:  I discussed with them that she has dementia Alzheimer's type and " therefore I will start her on memantine since she is unable to take acetylcholinesterase inhibitors because of her bradycardia.  I will start her on 5 mg daily for 1 week, 5 mg twice a day the second week, 5 mg in the morning and 10 mg in evening the third week and then 10 mg twice a day thereafter.  Explained to them the adverse effects of the medication including headache, confusion, constipation.  They are to call me for any problems.  I will reevaluate her in 2 months time for follow-up.  Her daughter is going to supervise her medications.  I would recommend for her to continue taking citalopram for the next 2 months with her next clinic visit.      Other orders  -     memantine (NAMENDA) 5 MG tablet; 1 QD x1 wk, 1 BID 2nd wk, 1 am  3rd wk.  Dispense: 28 tablet; Refill: 0  -     memantine (NAMENDA) 10 MG tablet; 1 Q pm 3rd wk and 1 BID 4th wk and on.  Dispense: 60 tablet; Refill: 5           Total time spent with the patient and coordinating patient care was 45 minutes.    Follow Up  No follow-ups on file.  Patient was given instructions and counseling regarding her condition or for health maintenance advice. Please see specific information pulled into the AVS if appropriate.

## 2022-01-21 DIAGNOSIS — F32.0 CURRENT MILD EPISODE OF MAJOR DEPRESSIVE DISORDER WITHOUT PRIOR EPISODE: ICD-10-CM

## 2022-01-21 RX ORDER — ESCITALOPRAM OXALATE 5 MG/1
TABLET ORAL
Qty: 30 TABLET | Refills: 0 | Status: SHIPPED | OUTPATIENT
Start: 2022-01-21 | End: 2022-02-21

## 2022-01-27 DIAGNOSIS — Z12.31 SCREENING MAMMOGRAM FOR BREAST CANCER: Primary | ICD-10-CM

## 2022-02-01 ENCOUNTER — TELEPHONE (OUTPATIENT)
Dept: NEUROLOGY | Facility: CLINIC | Age: 77
End: 2022-02-01

## 2022-02-01 DIAGNOSIS — E03.9 ACQUIRED HYPOTHYROIDISM: ICD-10-CM

## 2022-02-01 RX ORDER — LEVOTHYROXINE SODIUM 88 UG/1
TABLET ORAL
Qty: 90 TABLET | Refills: 0 | Status: SHIPPED | OUTPATIENT
Start: 2022-02-01 | End: 2022-04-12 | Stop reason: SDUPTHER

## 2022-02-01 NOTE — TELEPHONE ENCOUNTER
Provider:   Caller: VIANNEY  Relationship to Patient: DAUGHTER/POA  Pharmacy: NA  Phone Number: [phone]  Reason for Call: PATIENTS DAUGHTER CANCELLED MEMORY APPT. STATES THEY DECIDED NOT TO START THE MEDICATION  HAD PRESCRIBED AND DON'T FEEL THE NEED TO COME BACK.   When was the patient last seen: 1-3-22

## 2022-02-19 DIAGNOSIS — F32.0 CURRENT MILD EPISODE OF MAJOR DEPRESSIVE DISORDER WITHOUT PRIOR EPISODE: ICD-10-CM

## 2022-02-21 RX ORDER — ESCITALOPRAM OXALATE 5 MG/1
TABLET ORAL
Qty: 90 TABLET | Refills: 0 | Status: SHIPPED | OUTPATIENT
Start: 2022-02-21 | End: 2022-04-12 | Stop reason: SDUPTHER

## 2022-03-27 NOTE — ASSESSMENT & PLAN NOTE
I discussed with them that she has dementia Alzheimer's type and therefore I will start her on memantine since she is unable to take acetylcholinesterase inhibitors because of her bradycardia.  I will start her on 5 mg daily for 1 week, 5 mg twice a day the second week, 5 mg in the morning and 10 mg in evening the third week and then 10 mg twice a day thereafter.  Explained to them the adverse effects of the medication including headache, confusion, constipation.  They are to call me for any problems.  I will reevaluate her in 2 months time for follow-up.  Her daughter is going to supervise her medications.  I would recommend for her to continue taking citalopram for the next 2 months with her next clinic visit.  
A Pediatrician  Pediatrics  .  [place]   Phone:   Fax:   Follow Up Time: 1-3 Days

## 2022-04-04 ENCOUNTER — APPOINTMENT (OUTPATIENT)
Dept: MAMMOGRAPHY | Facility: HOSPITAL | Age: 77
End: 2022-04-04

## 2022-04-04 ENCOUNTER — HOSPITAL ENCOUNTER (OUTPATIENT)
Dept: MAMMOGRAPHY | Facility: HOSPITAL | Age: 77
Discharge: HOME | End: 2022-04-04
Admitting: FAMILY MEDICINE

## 2022-04-04 DIAGNOSIS — Z12.31 SCREENING MAMMOGRAM FOR BREAST CANCER: ICD-10-CM

## 2022-04-04 PROCEDURE — 77067 SCR MAMMO BI INCL CAD: CPT

## 2022-04-04 PROCEDURE — 77063 BREAST TOMOSYNTHESIS BI: CPT

## 2022-04-12 ENCOUNTER — LAB (OUTPATIENT)
Dept: LAB | Facility: HOSPITAL | Age: 77
End: 2022-04-12

## 2022-04-12 ENCOUNTER — OFFICE VISIT (OUTPATIENT)
Dept: FAMILY MEDICINE CLINIC | Age: 77
End: 2022-04-12

## 2022-04-12 VITALS
OXYGEN SATURATION: 100 % | HEIGHT: 62 IN | SYSTOLIC BLOOD PRESSURE: 140 MMHG | BODY MASS INDEX: 25.62 KG/M2 | HEART RATE: 45 BPM | DIASTOLIC BLOOD PRESSURE: 37 MMHG | WEIGHT: 139.2 LBS | TEMPERATURE: 97.6 F

## 2022-04-12 DIAGNOSIS — E55.9 VITAMIN D DEFICIENCY: ICD-10-CM

## 2022-04-12 DIAGNOSIS — I10 ESSENTIAL HYPERTENSION: Primary | ICD-10-CM

## 2022-04-12 DIAGNOSIS — Z23 ENCOUNTER FOR IMMUNIZATION: ICD-10-CM

## 2022-04-12 DIAGNOSIS — E03.9 ACQUIRED HYPOTHYROIDISM: ICD-10-CM

## 2022-04-12 DIAGNOSIS — D51.8 OTHER VITAMIN B12 DEFICIENCY ANEMIA: ICD-10-CM

## 2022-04-12 DIAGNOSIS — F32.0 CURRENT MILD EPISODE OF MAJOR DEPRESSIVE DISORDER WITHOUT PRIOR EPISODE: ICD-10-CM

## 2022-04-12 DIAGNOSIS — Z11.59 ENCOUNTER FOR SCREENING FOR OTHER VIRAL DISEASES: ICD-10-CM

## 2022-04-12 DIAGNOSIS — I10 ESSENTIAL HYPERTENSION: ICD-10-CM

## 2022-04-12 DIAGNOSIS — R41.3 MEMORY LOSS: ICD-10-CM

## 2022-04-12 DIAGNOSIS — F02.80 ALZHEIMER'S DISEASE: ICD-10-CM

## 2022-04-12 DIAGNOSIS — G30.9 ALZHEIMER'S DISEASE: ICD-10-CM

## 2022-04-12 DIAGNOSIS — R00.1 BRADYCARDIA: ICD-10-CM

## 2022-04-12 LAB
25(OH)D3 SERPL-MCNC: 61.3 NG/ML (ref 30–100)
ALBUMIN SERPL-MCNC: 4 G/DL (ref 3.5–5.2)
ALBUMIN/GLOB SERPL: 1.3 G/DL
ALP SERPL-CCNC: 83 U/L (ref 39–117)
ALT SERPL W P-5'-P-CCNC: 15 U/L (ref 1–33)
ANION GAP SERPL CALCULATED.3IONS-SCNC: 8.6 MMOL/L (ref 5–15)
AST SERPL-CCNC: 19 U/L (ref 1–32)
BILIRUB SERPL-MCNC: 0.4 MG/DL (ref 0–1.2)
BUN SERPL-MCNC: 26 MG/DL (ref 8–23)
BUN/CREAT SERPL: 27.4 (ref 7–25)
CALCIUM SPEC-SCNC: 9.4 MG/DL (ref 8.6–10.5)
CHLORIDE SERPL-SCNC: 108 MMOL/L (ref 98–107)
CHOLEST SERPL-MCNC: 161 MG/DL (ref 0–200)
CO2 SERPL-SCNC: 26.4 MMOL/L (ref 22–29)
CREAT SERPL-MCNC: 0.95 MG/DL (ref 0.57–1)
DEPRECATED RDW RBC AUTO: 49.2 FL (ref 37–54)
EGFRCR SERPLBLD CKD-EPI 2021: 62.6 ML/MIN/1.73
ERYTHROCYTE [DISTWIDTH] IN BLOOD BY AUTOMATED COUNT: 14.7 % (ref 12.3–15.4)
GLOBULIN UR ELPH-MCNC: 3 GM/DL
GLUCOSE SERPL-MCNC: 103 MG/DL (ref 65–99)
HCT VFR BLD AUTO: 43 % (ref 34–46.6)
HCV AB SER DONR QL: NORMAL
HDLC SERPL-MCNC: 51 MG/DL (ref 40–60)
HGB BLD-MCNC: 13.3 G/DL (ref 12–15.9)
LDLC SERPL CALC-MCNC: 99 MG/DL (ref 0–100)
LDLC/HDLC SERPL: 1.95 {RATIO}
MCH RBC QN AUTO: 27.8 PG (ref 26.6–33)
MCHC RBC AUTO-ENTMCNC: 30.9 G/DL (ref 31.5–35.7)
MCV RBC AUTO: 90 FL (ref 79–97)
PLATELET # BLD AUTO: 159 10*3/MM3 (ref 140–450)
PMV BLD AUTO: 9.9 FL (ref 6–12)
POTASSIUM SERPL-SCNC: 4.4 MMOL/L (ref 3.5–5.2)
PROT SERPL-MCNC: 7 G/DL (ref 6–8.5)
RBC # BLD AUTO: 4.78 10*6/MM3 (ref 3.77–5.28)
SODIUM SERPL-SCNC: 143 MMOL/L (ref 136–145)
T4 FREE SERPL-MCNC: 1.52 NG/DL (ref 0.93–1.7)
TRIGL SERPL-MCNC: 53 MG/DL (ref 0–150)
TSH SERPL DL<=0.05 MIU/L-ACNC: 1.77 UIU/ML (ref 0.27–4.2)
VIT B12 BLD-MCNC: 452 PG/ML (ref 211–946)
VLDLC SERPL-MCNC: 11 MG/DL (ref 5–40)
WBC NRBC COR # BLD: 4.78 10*3/MM3 (ref 3.4–10.8)

## 2022-04-12 PROCEDURE — 36415 COLL VENOUS BLD VENIPUNCTURE: CPT

## 2022-04-12 PROCEDURE — 86803 HEPATITIS C AB TEST: CPT

## 2022-04-12 PROCEDURE — 84439 ASSAY OF FREE THYROXINE: CPT

## 2022-04-12 PROCEDURE — 91305 COVID-19 (PFIZER) 12+ YRS: CPT | Performed by: FAMILY MEDICINE

## 2022-04-12 PROCEDURE — 84443 ASSAY THYROID STIM HORMONE: CPT

## 2022-04-12 PROCEDURE — 85027 COMPLETE CBC AUTOMATED: CPT

## 2022-04-12 PROCEDURE — 80053 COMPREHEN METABOLIC PANEL: CPT

## 2022-04-12 PROCEDURE — 99214 OFFICE O/P EST MOD 30 MIN: CPT | Performed by: FAMILY MEDICINE

## 2022-04-12 PROCEDURE — 80061 LIPID PANEL: CPT

## 2022-04-12 PROCEDURE — 82306 VITAMIN D 25 HYDROXY: CPT

## 2022-04-12 PROCEDURE — 0053A COVID-19 (PFIZER) 12+ YRS: CPT | Performed by: FAMILY MEDICINE

## 2022-04-12 PROCEDURE — 82607 VITAMIN B-12: CPT

## 2022-04-12 RX ORDER — LEVOTHYROXINE SODIUM 88 UG/1
88 TABLET ORAL DAILY
Qty: 90 TABLET | Refills: 1 | Status: SHIPPED | OUTPATIENT
Start: 2022-04-12 | End: 2022-10-13 | Stop reason: SDUPTHER

## 2022-04-12 RX ORDER — BENAZEPRIL HYDROCHLORIDE 10 MG/1
10 TABLET ORAL 2 TIMES DAILY
Qty: 180 TABLET | Refills: 1 | Status: SHIPPED | OUTPATIENT
Start: 2022-04-12 | End: 2022-10-13 | Stop reason: SDUPTHER

## 2022-04-12 RX ORDER — ESCITALOPRAM OXALATE 5 MG/1
5 TABLET ORAL DAILY
Qty: 90 TABLET | Refills: 1 | Status: SHIPPED | OUTPATIENT
Start: 2022-04-12 | End: 2022-10-13 | Stop reason: SDUPTHER

## 2022-04-12 NOTE — PROGRESS NOTES
Eileen Plascencia presents to [facility].    Chief Complaint:  Medication review and refill, BP and thyroid  Subjective       History of Present Illness:  HPI    Concerning hypothyroidism, unspecified, she is currently taking Levothyroid, 88 mcg daily.  TSH was last checked 3 days ago.  The result was reported as normal.  She denies any related symptoms.  She reports no symptoms suggestive of adverse medication effect.            Dx with essential (primary) hypertension; her current cardiac medication regimen includes an ACE inhibitor.  Compliance with treatment has been good; she takes her medication as directed.  She is tolerating the medication well without side effects.  She has not kept a blood pressure diary, but states that pressures have been well controlled.       76-year-old woman seeing Dr Cedeño for memory problems and depression, she is stable and doing well with lexapro.  She is sleeping well.  Minimal crying spells (grief over her husbands death), mood is good.         Review of Systems:  Review of Systems   Constitutional: Negative for chills, fatigue and fever.   HENT: Negative for ear pain, sinus pressure and sore throat.    Eyes: Negative for blurred vision and double vision.   Respiratory: Negative for cough, shortness of breath and wheezing.    Cardiovascular: Negative for chest pain and palpitations.   Gastrointestinal: Negative for abdominal pain, blood in stool, constipation, diarrhea, nausea and vomiting.   Skin: Negative for rash.   Neurological: Negative for dizziness and headache.   Psychiatric/Behavioral: Negative for sleep disturbance, suicidal ideas and stress.        Objective   Medical History:  Past Medical History:   • Abnormal weight loss   • Allergies    BENADRYL   MODERATE   • Alzheimer's disease (HCC)   • AR (allergic rhinitis)    UNSPECIFIED   • Asymptomatic menopausal state   • Bradycardia   • Bradycardia, unspecified   • Breast cancer  (HCC)    NO CHEMO OR RADIATION   • Cardiac murmur, unspecified   • Cervicalgia   • Contusion of elbow and forearm, left, sequela   • Essential (primary) hypertension   • Essential hypertension   • Hypertension   • Hypothyroidism, unspecified   • Major depressive disorder, single episode, mild (HCC)   • Memory loss   • Nonrheumatic aortic valve disorder, unspecified   • Overweight   • Personal history of malignant neoplasm of breast   • Polyp of nasal cavity   • Rheumatic mitral stenosis   • Unspecified fall, subsequent encounter   • Vitamin B12 deficiency anemia due to intrinsic factor deficiency   • Vitamin D deficiency, unspecified     Past Surgical History:   • COLONOSCOPY   • MASTECTOMY   • TONSILLECTOMY AND ADENOIDECTOMY      Family History   Problem Relation Age of Onset   • Cancer Mother    • Coronary artery disease Father    • Peripheral vascular disease Father    • Diabetes type II Father      Social History     Tobacco Use   • Smoking status: Never Smoker   • Smokeless tobacco: Never Used   Substance Use Topics   • Alcohol use: Never       Health Maintenance Due   Topic Date Due   • HEPATITIS C SCREENING  Never done   • LIPID PANEL  07/21/2021        Immunization History   Administered Date(s) Administered   • COVID-19 (PFIZER) PURPLE CAP 04/28/2021, 05/19/2021   • Covid-19 (Pfizer) Gray Cap 04/12/2022   • Fluzone High Dose =>65 Years ([employer] ONLY) 09/23/2016, 09/12/2017   • Fluzone High-Dose 65+yrs 08/18/2020   • Influenza, Unspecified 09/30/2019   • Pneumococcal Conjugate 13-Valent (PCV13) 03/04/2016, 08/18/2020   • Pneumococcal Polysaccharide (PPSV23) 04/01/2013   • Tdap 09/19/2018       No Known Allergies     Medications:  Current Outpatient Medications on File Prior to Visit   Medication Sig   • Cyanocobalamin (VITAMIN B12 PO) Take  by mouth.   • multivitamin with minerals tablet tablet Take 1 tablet by mouth Daily.   • VITAMIN D PO Take  by mouth Daily.   • [DISCONTINUED] benazepril (LOTENSIN) 10 MG  "tablet Take 1 tablet by mouth 2 (Two) Times a Day.   • [DISCONTINUED] escitalopram (LEXAPRO) 5 MG tablet Take 1 tablet by mouth once daily   • [DISCONTINUED] levothyroxine (SYNTHROID, LEVOTHROID) 88 MCG tablet Take 1 tablet by mouth once daily   • [DISCONTINUED] memantine (NAMENDA) 10 MG tablet 1 Q pm 3rd wk and 1 BID 4th wk and on.   • [DISCONTINUED] memantine (NAMENDA) 5 MG tablet 1 QD x1 wk, 1 BID 2nd wk, 1 am  3rd wk.     No current facility-administered medications on file prior to visit.       Vital Signs:   BP (!) 140/37 (BP Location: Right arm, Patient Position: Sitting, Cuff Size: Adult)   Pulse (!) 45   Temp 97.6 °F (36.4 °C) (Oral)   Ht 157.5 cm (62.01\")   Wt 63.1 kg (139 lb 3.2 oz)   SpO2 100%   BMI 25.45 kg/m²       Physical Exam:  Physical Exam  Vitals and nursing note reviewed.   Constitutional:       General: She is not in acute distress.     Appearance: Normal appearance. She is not ill-appearing, toxic-appearing or diaphoretic.   HENT:      Head: Normocephalic and atraumatic.      Right Ear: Tympanic membrane, ear canal and external ear normal.      Left Ear: Tympanic membrane, ear canal and external ear normal.      Nose: No congestion or rhinorrhea.      Mouth/Throat:      Mouth: Mucous membranes are moist.      Pharynx: Oropharynx is clear. No oropharyngeal exudate or posterior oropharyngeal erythema.   Eyes:      Extraocular Movements: Extraocular movements intact.      Conjunctiva/sclera: Conjunctivae normal.      Pupils: Pupils are equal, round, and reactive to light.   Cardiovascular:      Rate and Rhythm: Normal rate and regular rhythm.      Heart sounds: Normal heart sounds.   Pulmonary:      Effort: Pulmonary effort is normal.      Breath sounds: Normal breath sounds. No wheezing, rhonchi or rales.   Abdominal:      General: Abdomen is flat.      Palpations: Abdomen is soft.   Musculoskeletal:      Cervical back: Neck supple. No rigidity.   Lymphadenopathy:      Cervical: No " cervical adenopathy.   Skin:     General: Skin is warm and dry.   Neurological:      Mental Status: She is alert and oriented to person, place, and time.   Psychiatric:         Mood and Affect: Mood normal.         Behavior: Behavior normal.         Result Review      The following data was reviewed by Sarah Rea MD on 04/12/2022.  Lab Results   Component Value Date    WBC 4.78 04/12/2022    HGB 13.3 04/12/2022    HCT 43.0 04/12/2022    MCV 90.0 04/12/2022     04/12/2022     Lab Results   Component Value Date    GLUCOSE 76 07/21/2021    BUN 13 07/21/2021    CREATININE 0.94 07/21/2021    EGFRIFNONA 58 (L) 07/21/2021    BCR 13.8 07/21/2021    K 4.0 07/21/2021    CO2 25.2 07/21/2021    CALCIUM 9.5 07/21/2021    ALBUMIN 4.00 07/21/2021    LABIL2 1.1 (L) 03/02/2020    AST 21 07/21/2021    ALT 15 07/21/2021     Lab Results   Component Value Date    CHLPL 176 03/18/2019    TRIG 78 03/18/2019    HDL 49 03/18/2019     (H) 03/18/2019     Lab Results   Component Value Date    TSH 3.830 07/21/2021     No results found for: HGBA1C  No results found for: PSA                    Assessment and Plan:          Diagnoses and all orders for this visit:    1. Essential hypertension (Primary)  Comments:  Stable and well-controlled on current medications  Orders:  -     Comprehensive Metabolic Panel; Future  -     CBC (No Diff); Future  -     Lipid Panel; Future  -     benazepril (LOTENSIN) 10 MG tablet; Take 1 tablet by mouth 2 (Two) Times a Day.  Dispense: 180 tablet; Refill: 1    2. Alzheimer's disease (HCC)  Comments:  Memory actually improved with Lexapro.  Patient defers other treatment due to side effects of Alzheimer's medications.    3. Bradycardia  Comments:  She sees cardiology and no treatment is needed at this time.  She is asymptomatic    4. Encounter for screening for other viral diseases  -     Hepatitis C antibody; Future    5. Memory loss    6. Other vitamin B12 deficiency anemia  -     Vitamin  B12; Future    7. Vitamin D deficiency  -     Vitamin D 25 hydroxy; Future    8. Acquired hypothyroidism  Comments:  TSH reviewed and WNL, meds refilled today  Orders:  -     TSH+Free T4; Future  -     levothyroxine (SYNTHROID, LEVOTHROID) 88 MCG tablet; Take 1 tablet by mouth Daily.  Dispense: 90 tablet; Refill: 1    9. Current mild episode of major depressive disorder without prior episode (HCC)  -     escitalopram (LEXAPRO) 5 MG tablet; Take 1 tablet by mouth Daily.  Dispense: 90 tablet; Refill: 1    10. Essential hypertension  Comments:  stable on meds, meds refilled, labs reviewed.  Orders:  -     Comprehensive Metabolic Panel; Future  -     CBC (No Diff); Future  -     Lipid Panel; Future  -     benazepril (LOTENSIN) 10 MG tablet; Take 1 tablet by mouth 2 (Two) Times a Day.  Dispense: 180 tablet; Refill: 1    11. Encounter for immunization  -     COVID-19 Vaccine (Pfizer) Gray Cap    Other orders  -     Cancel: COVID-19 Vaccine (Pfizer) Purple Cap          Follow Up   No follow-ups on file.

## 2022-07-25 ENCOUNTER — OFFICE VISIT (OUTPATIENT)
Dept: FAMILY MEDICINE CLINIC | Age: 77
End: 2022-07-25

## 2022-07-25 VITALS
HEART RATE: 44 BPM | BODY MASS INDEX: 25.76 KG/M2 | WEIGHT: 140 LBS | DIASTOLIC BLOOD PRESSURE: 74 MMHG | SYSTOLIC BLOOD PRESSURE: 159 MMHG | HEIGHT: 62 IN | OXYGEN SATURATION: 97 %

## 2022-07-25 DIAGNOSIS — Z00.00 ENCOUNTER FOR ANNUAL WELLNESS EXAM IN MEDICARE PATIENT: Primary | ICD-10-CM

## 2022-07-25 DIAGNOSIS — Z23 ENCOUNTER FOR IMMUNIZATION: ICD-10-CM

## 2022-07-25 DIAGNOSIS — R00.1 BRADYCARDIA: ICD-10-CM

## 2022-07-25 DIAGNOSIS — Z12.31 ENCOUNTER FOR SCREENING MAMMOGRAM FOR BREAST CANCER: ICD-10-CM

## 2022-07-25 DIAGNOSIS — Z78.0 POSTMENOPAUSAL STATE: ICD-10-CM

## 2022-07-25 DIAGNOSIS — Z12.11 COLON CANCER SCREENING: ICD-10-CM

## 2022-07-25 DIAGNOSIS — G30.9 ALZHEIMER'S DISEASE: ICD-10-CM

## 2022-07-25 DIAGNOSIS — I10 ESSENTIAL HYPERTENSION: ICD-10-CM

## 2022-07-25 DIAGNOSIS — F02.80 ALZHEIMER'S DISEASE: ICD-10-CM

## 2022-07-25 DIAGNOSIS — Z90.11 H/O TOTAL MASTECTOMY OF RIGHT BREAST: ICD-10-CM

## 2022-07-25 PROCEDURE — 1170F FXNL STATUS ASSESSED: CPT | Performed by: FAMILY MEDICINE

## 2022-07-25 PROCEDURE — G0439 PPPS, SUBSEQ VISIT: HCPCS | Performed by: FAMILY MEDICINE

## 2022-07-25 PROCEDURE — 99214 OFFICE O/P EST MOD 30 MIN: CPT | Performed by: FAMILY MEDICINE

## 2022-07-25 PROCEDURE — 1159F MED LIST DOCD IN RCRD: CPT | Performed by: FAMILY MEDICINE

## 2022-07-25 NOTE — PROGRESS NOTES
The ABCs of the Annual Wellness Visit  Subsequent Medicare Wellness Visit    Chief Complaint   Patient presents with   • Medicare Wellness-subsequent      Subjective    History of Present Illness:  Eileen Plascencia is a 77 y.o. female who presents for a Subsequent Medicare Wellness Visit.    The following portions of the patient's history were reviewed and   updated as appropriate: allergies, current medications, past family history, past medical history, past social history, past surgical history and problem list.    Compared to one year ago, the patient feels her physical   health is the same.    Compared to one year ago, the patient feels her mental   health is better.  She is on lexapro, grieving the loss of her .     Recent Hospitalizations:  She was not admitted to the hospital during the last year.        Marina is in with stable memory loss, forgetfullness and being unaware of her conversation, has not worsened per her daughter.   she has seen neurology and told CT was normal, dx with mild cognitive dementia, and he recommended medication, but family deferred.   Her depression is stable on meds, lexapro, she is coping well, sleeping ok.  Has intermittent crying spells.     Concerning hypothyroidism, unspecified, she is currently taking Levothyroid, 88 mcg daily.  TSH was last checked 3 months ago and was normal. She denies any related symptoms.  She reports no symptoms suggestive of adverse medication effect.            Dx with essential (primary) hypertension; her current cardiac medication regimen includes an ACE inhibitor (BENAZEPRIL).  Compliance with treatment has been good; she takes her medication as directed.  She is tolerating the medication well without side effects.  She has not kept a blood pressure diary, but states that pressures have been well controlled-AROUND 130/70 .       Chronic bradycardia with patient with sinus bradycardia on her EKG and no overt symptoms. She saw Dr Justin  who noted she appears to be doing all her ADLs without any functional limitations. He thinks it is just a normal variant if develops any increased fatigability or limiting shortness of breath issues then will recommend proceeding with 24-hour Holter and treadmill stress test to evaluate heart rate response to activity.  She states she is not having any acute issues at this time.         Current Medical Providers:  Patient Care Team:  Sarah Rea MD as PCP - General (Family Medicine)  Cristian Justin MD as Consulting Physician (Cardiology)  Selwyn Mathur MD as Consulting Physician (Neurology)  Addison Barrera OD (Optometry)    Outpatient Medications Prior to Visit   Medication Sig Dispense Refill   • benazepril (LOTENSIN) 10 MG tablet Take 1 tablet by mouth 2 (Two) Times a Day. 180 tablet 1   • Cyanocobalamin (VITAMIN B12 PO) Take  by mouth.     • escitalopram (LEXAPRO) 5 MG tablet Take 1 tablet by mouth Daily. 90 tablet 1   • levothyroxine (SYNTHROID, LEVOTHROID) 88 MCG tablet Take 1 tablet by mouth Daily. 90 tablet 1   • VITAMIN D PO Take  by mouth Daily.     • multivitamin with minerals tablet tablet Take 1 tablet by mouth Daily.       No facility-administered medications prior to visit.       No opioid medication identified on active medication list. I have reviewed chart for other potential  high risk medication/s and harmful drug interactions in the elderly.          Aspirin is not on active medication list.  Aspirin use is not indicated based on review of current medical condition/s. Risk of harm outweighs potential benefits.  .    Patient Active Problem List   Diagnosis   • Bradycardia   • Essential hypertension   • Alzheimer's disease (HCC)   • Encounter for annual wellness exam in Medicare patient     Advance Care Planning  Advance Directive is on file.  ACP discussion was held with the patient during this visit. Patient has an advance directive in EMR which is still valid.          "  Objective    Vitals:    07/25/22 1153 07/25/22 1157   BP: 160/76 159/74   BP Location: Right arm    Patient Position: Sitting    Cuff Size: Adult    Pulse: (!) 44    SpO2: 97%    Weight: 63.5 kg (140 lb)    Height: 157.5 cm (62.01\")      BMI Readings from Last 1 Encounters:   07/25/22 25.60 kg/m²   BMI is above normal parameters. Recommendations include: exercise counseling and nutrition counseling    Does the patient have evidence of cognitive impairment? No    Physical Exam  Vitals and nursing note reviewed.   Constitutional:       General: She is not in acute distress.     Appearance: Normal appearance. She is not ill-appearing, toxic-appearing or diaphoretic.   HENT:      Head: Normocephalic and atraumatic.      Right Ear: Tympanic membrane, ear canal and external ear normal.      Left Ear: Tympanic membrane, ear canal and external ear normal.      Nose: No congestion or rhinorrhea.      Mouth/Throat:      Mouth: Mucous membranes are moist.      Pharynx: Oropharynx is clear. No oropharyngeal exudate or posterior oropharyngeal erythema.   Eyes:      Extraocular Movements: Extraocular movements intact.      Conjunctiva/sclera: Conjunctivae normal.      Pupils: Pupils are equal, round, and reactive to light.   Cardiovascular:      Rate and Rhythm: Normal rate and regular rhythm.      Heart sounds: Normal heart sounds.   Pulmonary:      Effort: Pulmonary effort is normal.      Breath sounds: Normal breath sounds. No wheezing, rhonchi or rales.   Chest:   Breasts:      Left: Normal.       Abdominal:      General: Abdomen is flat.      Palpations: Abdomen is soft.   Musculoskeletal:      Cervical back: Neck supple. No rigidity.   Lymphadenopathy:      Cervical: No cervical adenopathy.   Skin:     General: Skin is warm and dry.   Neurological:      Mental Status: She is alert and oriented to person, place, and time.   Psychiatric:         Mood and Affect: Mood normal.         Behavior: Behavior normal.           "       HEALTH RISK ASSESSMENT    Smoking Status:  Social History     Tobacco Use   Smoking Status Never Smoker   Smokeless Tobacco Never Used     Alcohol Consumption:  Social History     Substance and Sexual Activity   Alcohol Use Never     Fall Risk Screen:    STEADI Fall Risk Assessment was completed, and patient is at LOW risk for falls.Assessment completed on:7/25/2022    Depression Screening:  PHQ-2/PHQ-9 Depression Screening 7/25/2022   Retired PHQ-9 Total Score -   Retired Total Score -   Little Interest or Pleasure in Doing Things 0-->not at all   Feeling Down, Depressed or Hopeless 0-->not at all   PHQ-9: Brief Depression Severity Measure Score 0       Health Habits and Functional and Cognitive Screening:  Functional & Cognitive Status 7/25/2022   Do you have difficulty preparing food and eating? Yes   Do you have difficulty bathing yourself, getting dressed or grooming yourself? No   Do you have difficulty using the toilet? No   Do you have difficulty moving around from place to place? No   Do you have trouble with steps or getting out of a bed or a chair? No   Current Diet Well Balanced Diet   Dental Exam Up to date   Eye Exam Up to date   Exercise (times per week) 2 times per week   Current Exercises Include Walking   Do you need help using the phone?  No   Are you deaf or do you have serious difficulty hearing?  No   Do you need help with transportation? No   Do you need help shopping? No   Do you need help preparing meals?  Yes   Do you need help with housework?  No   Do you need help with laundry? No   Do you need help taking your medications? Yes   Do you need help managing money? Yes   Do you ever drive or ride in a car without wearing a seat belt? No   Have you felt unusual stress, anger or loneliness in the last month? No   Who do you live with? Alone   If you need help, do you have trouble finding someone available to you? Yes   Have you been bothered in the last four weeks by sexual problems? No    Do you have difficulty concentrating, remembering or making decisions? Yes       Age-appropriate Screening Schedule:  Refer to the list below for future screening recommendations based on patient's age, sex and/or medical conditions. Orders for these recommended tests are listed in the plan section. The patient has been provided with a written plan.    Health Maintenance   Topic Date Due   • ZOSTER VACCINE (1 of 2) 09/01/2022 (Originally 6/[birth year]/1996)   • INFLUENZA VACCINE  10/01/2022   • DXA SCAN  03/03/2023   • LIPID PANEL  04/12/2023   • TDAP/TD VACCINES (2 - Td or Tdap) 09/19/2028                The following data was reviewed by: Sarah Rea MD on 07/25/2022:  CMP    CMP 4/12/22   Glucose 103 (A)   BUN 26 (A)   Creatinine 0.95   Sodium 143   Potassium 4.4   Chloride 108 (A)   Calcium 9.4   Albumin 4.00   Total Bilirubin 0.4   Alkaline Phosphatase 83   AST (SGOT) 19   ALT (SGPT) 15   (A) Abnormal value            CBC    CBC 4/12/22   WBC 4.78   RBC 4.78   Hemoglobin 13.3   Hematocrit 43.0   MCV 90.0   MCH 27.8   MCHC 30.9 (A)   RDW 14.7   Platelets 159   (A) Abnormal value            Lipid Panel    Lipid Panel 4/12/22   Total Cholesterol 161   Triglycerides 53   HDL Cholesterol 51   VLDL Cholesterol 11   LDL Cholesterol  99   LDL/HDL Ratio 1.95           TSH    TSH 4/12/22   TSH 1.770             HgB    HGB 4/12/22   Hemoglobin 13.3                        Assessment & Plan   CMS Preventative Services Quick Reference  Risk Factors Identified During Encounter  Dementia/Memory   Depression/Dysphoria  Immunizations Discussed/Encouraged (specific Immunizations; Influenza and Shingrix  The above risks/problems have been discussed with the patient.  Follow up actions/plans if indicated are seen below in the Assessment/Plan Section.  Pertinent information has been shared with the patient in the After Visit Summary.    Diagnoses and all orders for this visit:    1. Encounter for annual wellness exam in Medicare  patient (Primary)  Assessment & Plan:  MEDICARE WELLNESS EXAM-SHE IS UTD ON BREAST EXAM/SCREENING MAMMOGRAM  ORDERED, UTD ON COLONOSCOPY (D/W HER COLOGUARD 2023)/ utd ON DEXA (osteopenia 3/2021)  D/W HER IMMUNIZATIONS PNEUMOVAX 20/ TD/COVID BOOSTERS/YEARLY FLU VACCINE , DONE WITH PAP/PELVIC,  NO FALL RISK, MILD MEMORY ISSUES ARE STABLE, MILD  DEPRESSION, SHE IS NO LONGER DRIVING, ABLE TO PERFORM ADL'S/FINANCES, SHE WAS GIVEN A COPY OF HA ON PREV SERVICES/SAFETY HANDOUT WAS GIVEN TO HER. HEARING IS ADEQUATE, MD LIST UPDATED, NO URINARY INCONTINENCE, SHE HAS A LIVING WILL, HEARING IS ADEQUATE.      2. Encounter for screening mammogram for breast cancer  Comments:  mammogram is UTD    3. Encounter for immunization  Comments:  UTD, recommend shingrix-check with pharmacy    4. Colon cancer screening    5. Postmenopausal state  Comments:  dexa UTD    6. Alzheimer's disease (HCC)  Comments:  recommend aricept, family defers at this time, f/u if worsening symptoms of dementia    7. Bradycardia  Comments:  I reviewed Dr Garrett note,family doesnt want to f/u at this time, not symptomatic-will hold card re eval at this time, she is to f/u if SOA/fatigue    8. Essential hypertension  Comments:  148/62-may need to increasse her benazapril, will have family check home BP if >140/90 consistently, increase benazeprol to 20 mg in am and 10 mg qhs.     9. H/O total mastectomy of right breast  Comments:  she may need her breast implant for her bra reordered.       Follow Up:   Return in about 6 months (around 1/25/2023) for Recheck.     An After Visit Summary and PPPS were made available to the patient.

## 2022-07-25 NOTE — ASSESSMENT & PLAN NOTE
MEDICARE WELLNESS EXAM-SHE IS UTD ON BREAST EXAM/SCREENING MAMMOGRAM  ORDERED, UTD ON COLONOSCOPY (D/W HER COLOGUARD 2023)/ utd ON DEXA (osteopenia 3/2021)  D/W HER IMMUNIZATIONS PNEUMOVAX 20/ TD/COVID BOOSTERS/YEARLY FLU VACCINE , DONE WITH PAP/PELVIC,  NO FALL RISK, MILD MEMORY ISSUES ARE STABLE, MILD  DEPRESSION, SHE IS NO LONGER DRIVING, ABLE TO PERFORM ADL'S/FINANCES, SHE WAS GIVEN A COPY OF HA ON PREV SERVICES/SAFETY HANDOUT WAS GIVEN TO HER. HEARING IS ADEQUATE, MD LIST UPDATED, NO URINARY INCONTINENCE, SHE HAS A LIVING WILL, HEARING IS ADEQUATE.

## 2022-10-13 ENCOUNTER — OFFICE VISIT (OUTPATIENT)
Dept: FAMILY MEDICINE CLINIC | Age: 77
End: 2022-10-13

## 2022-10-13 ENCOUNTER — LAB (OUTPATIENT)
Dept: LAB | Facility: HOSPITAL | Age: 77
End: 2022-10-13

## 2022-10-13 VITALS
DIASTOLIC BLOOD PRESSURE: 56 MMHG | HEART RATE: 51 BPM | TEMPERATURE: 98.7 F | OXYGEN SATURATION: 97 % | WEIGHT: 142.8 LBS | HEIGHT: 62 IN | SYSTOLIC BLOOD PRESSURE: 137 MMHG | BODY MASS INDEX: 26.28 KG/M2

## 2022-10-13 DIAGNOSIS — E03.9 ACQUIRED HYPOTHYROIDISM: ICD-10-CM

## 2022-10-13 DIAGNOSIS — F32.0 CURRENT MILD EPISODE OF MAJOR DEPRESSIVE DISORDER WITHOUT PRIOR EPISODE: ICD-10-CM

## 2022-10-13 DIAGNOSIS — D51.8 OTHER VITAMIN B12 DEFICIENCY ANEMIA: ICD-10-CM

## 2022-10-13 DIAGNOSIS — I10 ESSENTIAL HYPERTENSION: ICD-10-CM

## 2022-10-13 DIAGNOSIS — R41.3 MEMORY LOSS: ICD-10-CM

## 2022-10-13 DIAGNOSIS — E55.9 VITAMIN D DEFICIENCY: ICD-10-CM

## 2022-10-13 DIAGNOSIS — Z23 FLU VACCINE NEED: ICD-10-CM

## 2022-10-13 DIAGNOSIS — Z23 NEED FOR VACCINATION: ICD-10-CM

## 2022-10-13 DIAGNOSIS — R01.1 HEART MURMUR: ICD-10-CM

## 2022-10-13 DIAGNOSIS — I10 ESSENTIAL HYPERTENSION: Primary | ICD-10-CM

## 2022-10-13 PROCEDURE — 80053 COMPREHEN METABOLIC PANEL: CPT

## 2022-10-13 PROCEDURE — G0008 ADMIN INFLUENZA VIRUS VAC: HCPCS | Performed by: FAMILY MEDICINE

## 2022-10-13 PROCEDURE — 36415 COLL VENOUS BLD VENIPUNCTURE: CPT

## 2022-10-13 PROCEDURE — 82306 VITAMIN D 25 HYDROXY: CPT

## 2022-10-13 PROCEDURE — 99214 OFFICE O/P EST MOD 30 MIN: CPT | Performed by: FAMILY MEDICINE

## 2022-10-13 PROCEDURE — 90662 IIV NO PRSV INCREASED AG IM: CPT | Performed by: FAMILY MEDICINE

## 2022-10-13 PROCEDURE — 0124A COVID-19 (PFIZER) BIVALENT BOOSTER 12+YRS: CPT | Performed by: FAMILY MEDICINE

## 2022-10-13 PROCEDURE — 91312 COVID-19 (PFIZER) BIVALENT BOOSTER 12+YRS: CPT | Performed by: FAMILY MEDICINE

## 2022-10-13 PROCEDURE — 84443 ASSAY THYROID STIM HORMONE: CPT

## 2022-10-13 PROCEDURE — 82607 VITAMIN B-12: CPT

## 2022-10-13 PROCEDURE — 84439 ASSAY OF FREE THYROXINE: CPT

## 2022-10-13 RX ORDER — BENAZEPRIL HYDROCHLORIDE 10 MG/1
10 TABLET ORAL 2 TIMES DAILY
Qty: 180 TABLET | Refills: 1 | Status: SHIPPED | OUTPATIENT
Start: 2022-10-13

## 2022-10-13 RX ORDER — ESCITALOPRAM OXALATE 5 MG/1
5 TABLET ORAL DAILY
Qty: 90 TABLET | Refills: 1 | Status: SHIPPED | OUTPATIENT
Start: 2022-10-13

## 2022-10-13 RX ORDER — LEVOTHYROXINE SODIUM 88 UG/1
88 TABLET ORAL DAILY
Qty: 90 TABLET | Refills: 1 | Status: SHIPPED | OUTPATIENT
Start: 2022-10-13

## 2022-10-13 NOTE — PROGRESS NOTES
Eileen Plascencia presents to [facility].    Chief Complaint:  Medication review for BP and thyroid  Subjective       History of Present Illness:    Concerning hypothyroidism, unspecified, she is currently taking Levothyroid, 88 mcg daily.  TSH was last checked 3 days ago.  The result was reported as normal.  She denies any related symptoms.  She reports no symptoms suggestive of adverse medication effect.            Dx with essential (primary) hypertension; her current cardiac medication regimen includes an ACE inhibitor benazepril.  Compliance with treatment has been good; she takes her medication as directed.  She is tolerating the medication well without side effects.  She has not kept a blood pressure diary, but states that pressures have been well controlled.       76-year-old with memory issues, had seen Dr Cedeño for memory problems and depression and she and family decided not to proceed with meds due to risks of SE.  Her daughter lives next door to her.  Low risk for falls. She tripped this week cleaning up her yard. Her moods are stable and she is doing well with lexapro.  She is sleeping well.  Ongoing grief over her husbands death.        Review of Systems:  Review of Systems   Constitutional: Negative for chills, fatigue and fever.   HENT: Negative for ear pain, sinus pressure and sore throat.    Eyes: Negative for blurred vision and double vision.   Respiratory: Negative for cough, shortness of breath and wheezing.    Cardiovascular: Negative for chest pain and palpitations.   Gastrointestinal: Negative for abdominal pain, blood in stool, constipation, diarrhea, nausea and vomiting.   Skin: Negative for rash.   Neurological: Negative for dizziness and headache.   Psychiatric/Behavioral: Negative for sleep disturbance, suicidal ideas and stress.        Objective   Medical History:  Past Medical History:   • Abnormal weight loss   • Allergies    BENADRYL   MODERATE    • Alzheimer's disease (HCC)   • AR (allergic rhinitis)    UNSPECIFIED   • Asymptomatic menopausal state   • Bradycardia   • Bradycardia, unspecified   • Breast cancer (HCC)    NO CHEMO OR RADIATION   • Cardiac murmur, unspecified   • Cervicalgia   • Contusion of elbow and forearm, left, sequela   • Essential (primary) hypertension   • Essential hypertension   • Hypertension   • Hypothyroidism, unspecified   • Major depressive disorder, single episode, mild (HCC)   • Memory loss   • Nonrheumatic aortic valve disorder, unspecified   • Overweight   • Personal history of malignant neoplasm of breast   • Polyp of nasal cavity   • Rheumatic mitral stenosis   • Unspecified fall, subsequent encounter   • Vitamin B12 deficiency anemia due to intrinsic factor deficiency   • Vitamin D deficiency, unspecified     Past Surgical History:   • COLONOSCOPY   • MASTECTOMY   • TONSILLECTOMY AND ADENOIDECTOMY      Family History   Problem Relation Age of Onset   • Cancer Mother    • Coronary artery disease Father    • Peripheral vascular disease Father    • Diabetes type II Father      Social History     Tobacco Use   • Smoking status: Never   • Smokeless tobacco: Never   Substance Use Topics   • Alcohol use: Never       Health Maintenance Due   Topic Date Due   • INFLUENZA VACCINE  08/01/2022        Immunization History   Administered Date(s) Administered   • COVID-19 (PFIZER) BIVALENT BOOSTER 12+YRS 10/13/2022   • COVID-19 (PFIZER) PURPLE CAP 04/28/2021, 05/19/2021   • Covid-19 (Pfizer) Gray Cap 04/12/2022   • Fluzone High Dose =>65 Years ([employer] ONLY) 09/23/2016, 09/12/2017   • Fluzone High-Dose 65+yrs 08/18/2020   • Influenza, Unspecified 09/30/2019   • Pneumococcal Conjugate 13-Valent (PCV13) 03/04/2016, 08/18/2020   • Pneumococcal Polysaccharide (PPSV23) 04/01/2013   • Tdap 09/19/2018       No Known Allergies     Medications:  Current Outpatient Medications on File Prior to Visit   Medication Sig   • Cyanocobalamin (VITAMIN  "B12 PO) Take  by mouth.   • VITAMIN D PO Take  by mouth Daily.   • [DISCONTINUED] benazepril (LOTENSIN) 10 MG tablet Take 1 tablet by mouth 2 (Two) Times a Day.   • [DISCONTINUED] escitalopram (LEXAPRO) 5 MG tablet Take 1 tablet by mouth Daily.   • [DISCONTINUED] levothyroxine (SYNTHROID, LEVOTHROID) 88 MCG tablet Take 1 tablet by mouth Daily.     No current facility-administered medications on file prior to visit.       Vital Signs:   /56 (BP Location: Left arm, Patient Position: Sitting, Cuff Size: Adult)   Pulse 51   Temp 98.7 °F (37.1 °C) (Oral)   Ht 157.5 cm (62.01\")   Wt 64.8 kg (142 lb 12.8 oz)   SpO2 97% Comment: room air  BMI 26.11 kg/m²       Physical Exam:  Physical Exam  Vitals and nursing note reviewed.   Constitutional:       General: She is not in acute distress.     Appearance: Normal appearance. She is not ill-appearing, toxic-appearing or diaphoretic.   HENT:      Head: Normocephalic and atraumatic.      Right Ear: Tympanic membrane, ear canal and external ear normal.      Left Ear: Tympanic membrane, ear canal and external ear normal.      Nose: No congestion or rhinorrhea.      Mouth/Throat:      Mouth: Mucous membranes are moist.      Pharynx: Oropharynx is clear. No oropharyngeal exudate or posterior oropharyngeal erythema.   Eyes:      Extraocular Movements: Extraocular movements intact.      Conjunctiva/sclera: Conjunctivae normal.      Pupils: Pupils are equal, round, and reactive to light.   Cardiovascular:      Rate and Rhythm: Normal rate and regular rhythm.      Heart sounds: Murmur heard.    Systolic murmur is present with a grade of 5/6.  Pulmonary:      Effort: Pulmonary effort is normal.      Breath sounds: Normal breath sounds. No wheezing, rhonchi or rales.   Abdominal:      General: Abdomen is flat.      Palpations: Abdomen is soft.   Musculoskeletal:      Cervical back: Neck supple. No rigidity.   Lymphadenopathy:      Cervical: No cervical adenopathy.   Skin:     " General: Skin is warm and dry.   Neurological:      Mental Status: She is alert and oriented to person, place, and time.   Psychiatric:         Mood and Affect: Mood normal.         Behavior: Behavior normal.         Result Review      The following data was reviewed by Sarah Rea MD on 04/12/2022.  Lab Results   Component Value Date    WBC 4.78 04/12/2022    HGB 13.3 04/12/2022    HCT 43.0 04/12/2022    MCV 90.0 04/12/2022     04/12/2022     Lab Results   Component Value Date    GLUCOSE 103 (H) 04/12/2022    BUN 26 (H) 04/12/2022    CREATININE 0.95 04/12/2022    EGFRIFNONA 58 (L) 07/21/2021    BCR 27.4 (H) 04/12/2022    K 4.4 04/12/2022    CO2 26.4 04/12/2022    CALCIUM 9.4 04/12/2022    ALBUMIN 4.00 04/12/2022    LABIL2 1.1 (L) 03/02/2020    AST 19 04/12/2022    ALT 15 04/12/2022     Lab Results   Component Value Date    CHOL 161 04/12/2022    CHLPL 176 03/18/2019    TRIG 53 04/12/2022    HDL 51 04/12/2022    LDL 99 04/12/2022     Lab Results   Component Value Date    TSH 1.770 04/12/2022     No results found for: HGBA1C  No results found for: PSA                      Assessment and Plan:          Diagnoses and all orders for this visit:    1. Essential hypertension (Primary)  Comments:  stable on meds, meds refilled, labs reviewed.  Orders:  -     Comprehensive Metabolic Panel; Future  -     benazepril (LOTENSIN) 10 MG tablet; Take 1 tablet by mouth 2 (Two) Times a Day.  Dispense: 180 tablet; Refill: 1    2. Memory loss  Comments:  defers treatment with medication, post neurology eval    3. Other vitamin B12 deficiency anemia  -     Vitamin B12; Future    4. Vitamin D deficiency  -     Vitamin D 25 hydroxy; Future    5. Acquired hypothyroidism  -     TSH+Free T4; Future  -     levothyroxine (SYNTHROID, LEVOTHROID) 88 MCG tablet; Take 1 tablet by mouth Daily.  Dispense: 90 tablet; Refill: 1    6. Current mild episode of major depressive disorder without prior episode (HCC)  -     escitalopram  (LEXAPRO) 5 MG tablet; Take 1 tablet by mouth Daily.  Dispense: 90 tablet; Refill: 1    7. Acquired hypothyroidism  Comments:  TSH reviewed and WNL, meds refilled today  Orders:  -     TSH+Free T4; Future  -     levothyroxine (SYNTHROID, LEVOTHROID) 88 MCG tablet; Take 1 tablet by mouth Daily.  Dispense: 90 tablet; Refill: 1    8. Need for vaccination  -     Cancel: COVID-19 Bivalent Booster (Pfizer) 12+yrs  -     Cancel: COVID-19 Bivalent Booster (Pfizer) 12+yrs    9. Flu vaccine need  -     Fluzone High-Dose 65+yrs (5316-9385)    10. Heart murmur  Comments:  aortic insufficiency-sees cardiology and told no further treatment needed, she is asymptomatic, last echo 2019    Other orders  -     COVID-19 Bivalent Booster (Pfizer) 12+yrs          Follow Up   Return in about 6 months (around 4/13/2023) for Recheck.

## 2022-10-14 LAB
25(OH)D3 SERPL-MCNC: 53 NG/ML (ref 30–100)
ALBUMIN SERPL-MCNC: 3.7 G/DL (ref 3.5–5.2)
ALBUMIN/GLOB SERPL: 1.3 G/DL
ALP SERPL-CCNC: 78 U/L (ref 39–117)
ALT SERPL W P-5'-P-CCNC: 11 U/L (ref 1–33)
ANION GAP SERPL CALCULATED.3IONS-SCNC: 4 MMOL/L (ref 5–15)
AST SERPL-CCNC: 13 U/L (ref 1–32)
BILIRUB SERPL-MCNC: 0.3 MG/DL (ref 0–1.2)
BUN SERPL-MCNC: 16 MG/DL (ref 8–23)
BUN/CREAT SERPL: 15.7 (ref 7–25)
CALCIUM SPEC-SCNC: 9.7 MG/DL (ref 8.6–10.5)
CHLORIDE SERPL-SCNC: 108 MMOL/L (ref 98–107)
CO2 SERPL-SCNC: 31 MMOL/L (ref 22–29)
CREAT SERPL-MCNC: 1.02 MG/DL (ref 0.57–1)
EGFRCR SERPLBLD CKD-EPI 2021: 57.1 ML/MIN/1.73
GLOBULIN UR ELPH-MCNC: 2.9 GM/DL
GLUCOSE SERPL-MCNC: 69 MG/DL (ref 65–99)
POTASSIUM SERPL-SCNC: 4.5 MMOL/L (ref 3.5–5.2)
PROT SERPL-MCNC: 6.6 G/DL (ref 6–8.5)
SODIUM SERPL-SCNC: 143 MMOL/L (ref 136–145)
T4 FREE SERPL-MCNC: 1.42 NG/DL (ref 0.93–1.7)
TSH SERPL DL<=0.05 MIU/L-ACNC: 0.94 UIU/ML (ref 0.27–4.2)
VIT B12 BLD-MCNC: 458 PG/ML (ref 211–946)

## 2023-04-13 ENCOUNTER — TELEPHONE (OUTPATIENT)
Dept: FAMILY MEDICINE CLINIC | Age: 78
End: 2023-04-13

## 2023-04-13 DIAGNOSIS — I10 ESSENTIAL HYPERTENSION: ICD-10-CM

## 2023-04-13 DIAGNOSIS — E03.9 ACQUIRED HYPOTHYROIDISM: ICD-10-CM

## 2023-04-13 DIAGNOSIS — F32.0 CURRENT MILD EPISODE OF MAJOR DEPRESSIVE DISORDER WITHOUT PRIOR EPISODE: ICD-10-CM

## 2023-04-13 RX ORDER — BENAZEPRIL HYDROCHLORIDE 10 MG/1
10 TABLET ORAL 2 TIMES DAILY
Qty: 180 TABLET | Refills: 0 | Status: SHIPPED | OUTPATIENT
Start: 2023-04-13

## 2023-04-13 RX ORDER — LEVOTHYROXINE SODIUM 88 UG/1
88 TABLET ORAL DAILY
Qty: 90 TABLET | Refills: 0 | Status: SHIPPED | OUTPATIENT
Start: 2023-04-13

## 2023-04-13 RX ORDER — ESCITALOPRAM OXALATE 5 MG/1
5 TABLET ORAL DAILY
Qty: 90 TABLET | Refills: 0 | Status: SHIPPED | OUTPATIENT
Start: 2023-04-13

## 2023-04-13 NOTE — TELEPHONE ENCOUNTER
Rx Refill Note  Requested Prescriptions     Pending Prescriptions Disp Refills   • benazepril (LOTENSIN) 10 MG tablet 180 tablet 1     Sig: Take 1 tablet by mouth 2 (Two) Times a Day.   • levothyroxine (SYNTHROID, LEVOTHROID) 88 MCG tablet 90 tablet 1     Sig: Take 1 tablet by mouth Daily.   • escitalopram (LEXAPRO) 5 MG tablet 90 tablet 1     Sig: Take 1 tablet by mouth Daily.      Last office visit with prescribing clinician: 10/13/2022        Next office visit with prescribing clinician: 7/27/23    {Diana Cabrera LPN  04/13/23, 15:49 EDT

## 2023-04-13 NOTE — TELEPHONE ENCOUNTER
Caller: Jessi Rosario    Relationship to patient: Emergency Contact    Best call back number: [phone]    Type of visit: MEDICARE WELLNESS/ FOLLOW UP    Requested date: WEEK BEFORE 07/27/2023    If rescheduling, when is the original appointment: 07/27/2023    Additional notes: PATIENT IS NEEDING TO RESCHEDULE MEDICARE WELLNESS FOR A WEEK EARLIER THAN THE APPOINTMENT DATE.   
1

## 2023-04-13 NOTE — TELEPHONE ENCOUNTER
Caller: AbrahamJessi    Relationship: Emergency Contact    Best call back number: [phone]    Requested Prescriptions:   Requested Prescriptions     Pending Prescriptions Disp Refills   • benazepril (LOTENSIN) 10 MG tablet 180 tablet 1     Sig: Take 1 tablet by mouth 2 (Two) Times a Day.   • levothyroxine (SYNTHROID, LEVOTHROID) 88 MCG tablet 90 tablet 1     Sig: Take 1 tablet by mouth Daily.   • escitalopram (LEXAPRO) 5 MG tablet 90 tablet 1     Sig: Take 1 tablet by mouth Daily.        Pharmacy where request should be sent: [place] [id number] [facility] [phone]  - [phone] FX     Last office visit with prescribing clinician: 10/13/2022   Last telemedicine visit with prescribing clinician: 7/27/2023   Next office visit with prescribing clinician: 7/27/2023     Additional details provided by patient: PATIENT CURRENTLY HAS ABOUT A WEEK LEFT.     Does the patient have less than a 3 day supply:  [] Yes  [x] No    Would you like a call back once the refill request has been completed: [x] Yes [] No    If the office needs to give you a call back, can they leave a voicemail: [] Yes [x] No    Nicki Gale Rep   04/13/23 14:23 EDT

## 2023-06-02 ENCOUNTER — TRANSCRIBE ORDERS (OUTPATIENT)
Dept: ADMINISTRATIVE | Facility: HOSPITAL | Age: 78
End: 2023-06-02
Payer: MEDICARE

## 2023-06-02 DIAGNOSIS — Z12.31 SCREENING MAMMOGRAM, ENCOUNTER FOR: Primary | ICD-10-CM

## 2023-07-24 ENCOUNTER — LAB (OUTPATIENT)
Dept: LAB | Facility: HOSPITAL | Age: 78
End: 2023-07-24
Payer: MEDICARE

## 2023-07-24 DIAGNOSIS — I10 ESSENTIAL HYPERTENSION: ICD-10-CM

## 2023-07-24 DIAGNOSIS — E03.9 ACQUIRED HYPOTHYROIDISM: ICD-10-CM

## 2023-07-24 LAB
ALBUMIN SERPL-MCNC: 4.2 G/DL (ref 3.5–5.2)
ALBUMIN/GLOB SERPL: 1.6 G/DL
ALP SERPL-CCNC: 86 U/L (ref 39–117)
ALT SERPL W P-5'-P-CCNC: 10 U/L (ref 1–33)
ANION GAP SERPL CALCULATED.3IONS-SCNC: 10.1 MMOL/L (ref 5–15)
AST SERPL-CCNC: 19 U/L (ref 1–32)
BILIRUB SERPL-MCNC: 0.6 MG/DL (ref 0–1.2)
BUN SERPL-MCNC: 14 MG/DL (ref 8–23)
BUN/CREAT SERPL: 11.8 (ref 7–25)
CALCIUM SPEC-SCNC: 9.4 MG/DL (ref 8.6–10.5)
CHLORIDE SERPL-SCNC: 106 MMOL/L (ref 98–107)
CHOLEST SERPL-MCNC: 195 MG/DL (ref 0–200)
CO2 SERPL-SCNC: 26.9 MMOL/L (ref 22–29)
CREAT SERPL-MCNC: 1.19 MG/DL (ref 0.57–1)
DEPRECATED RDW RBC AUTO: 45.4 FL (ref 37–54)
EGFRCR SERPLBLD CKD-EPI 2021: 47.2 ML/MIN/1.73
ERYTHROCYTE [DISTWIDTH] IN BLOOD BY AUTOMATED COUNT: 13.5 % (ref 12.3–15.4)
GLOBULIN UR ELPH-MCNC: 2.7 GM/DL
GLUCOSE SERPL-MCNC: 89 MG/DL (ref 65–99)
HCT VFR BLD AUTO: 45 % (ref 34–46.6)
HDLC SERPL-MCNC: 57 MG/DL (ref 40–60)
HGB BLD-MCNC: 14.5 G/DL (ref 12–15.9)
LDLC SERPL CALC-MCNC: 128 MG/DL (ref 0–100)
LDLC/HDLC SERPL: 2.22 {RATIO}
MCH RBC QN AUTO: 29 PG (ref 26.6–33)
MCHC RBC AUTO-ENTMCNC: 32.2 G/DL (ref 31.5–35.7)
MCV RBC AUTO: 90 FL (ref 79–97)
PLATELET # BLD AUTO: 179 10*3/MM3 (ref 140–450)
PMV BLD AUTO: 9.5 FL (ref 6–12)
POTASSIUM SERPL-SCNC: 4.4 MMOL/L (ref 3.5–5.2)
PROT SERPL-MCNC: 6.9 G/DL (ref 6–8.5)
RBC # BLD AUTO: 5 10*6/MM3 (ref 3.77–5.28)
SODIUM SERPL-SCNC: 143 MMOL/L (ref 136–145)
T4 FREE SERPL-MCNC: 1.58 NG/DL (ref 0.93–1.7)
TRIGL SERPL-MCNC: 56 MG/DL (ref 0–150)
TSH SERPL DL<=0.05 MIU/L-ACNC: 0.56 UIU/ML (ref 0.27–4.2)
VLDLC SERPL-MCNC: 10 MG/DL (ref 5–40)
WBC NRBC COR # BLD: 4.49 10*3/MM3 (ref 3.4–10.8)

## 2023-07-24 PROCEDURE — 80053 COMPREHEN METABOLIC PANEL: CPT

## 2023-07-24 PROCEDURE — 36415 COLL VENOUS BLD VENIPUNCTURE: CPT

## 2023-07-24 PROCEDURE — 80061 LIPID PANEL: CPT

## 2023-07-24 PROCEDURE — 84443 ASSAY THYROID STIM HORMONE: CPT

## 2023-07-24 PROCEDURE — 84439 ASSAY OF FREE THYROXINE: CPT

## 2023-07-24 PROCEDURE — 85027 COMPLETE CBC AUTOMATED: CPT

## 2023-10-03 ENCOUNTER — OFFICE VISIT (OUTPATIENT)
Dept: FAMILY MEDICINE CLINIC | Age: 78
End: 2023-10-03
Payer: MEDICARE

## 2023-10-03 VITALS
SYSTOLIC BLOOD PRESSURE: 147 MMHG | HEART RATE: 50 BPM | WEIGHT: 145.8 LBS | OXYGEN SATURATION: 100 % | BODY MASS INDEX: 26.83 KG/M2 | DIASTOLIC BLOOD PRESSURE: 53 MMHG | HEIGHT: 62 IN

## 2023-10-03 DIAGNOSIS — Z00.00 ENCOUNTER FOR ANNUAL WELLNESS EXAM IN MEDICARE PATIENT: Primary | ICD-10-CM

## 2023-10-03 DIAGNOSIS — E03.9 ACQUIRED HYPOTHYROIDISM: ICD-10-CM

## 2023-10-03 DIAGNOSIS — I10 ESSENTIAL HYPERTENSION: ICD-10-CM

## 2023-10-03 DIAGNOSIS — Z78.0 POSTMENOPAUSAL STATE: ICD-10-CM

## 2023-10-03 DIAGNOSIS — Z12.31 ENCOUNTER FOR SCREENING MAMMOGRAM FOR BREAST CANCER: ICD-10-CM

## 2023-10-03 DIAGNOSIS — Z23 ENCOUNTER FOR IMMUNIZATION: ICD-10-CM

## 2023-10-03 DIAGNOSIS — Z12.11 COLON CANCER SCREENING: ICD-10-CM

## 2023-10-03 DIAGNOSIS — F32.0 CURRENT MILD EPISODE OF MAJOR DEPRESSIVE DISORDER WITHOUT PRIOR EPISODE: ICD-10-CM

## 2023-10-03 DIAGNOSIS — R01.1 MURMUR, CARDIAC: ICD-10-CM

## 2023-10-03 PROCEDURE — 99213 OFFICE O/P EST LOW 20 MIN: CPT | Performed by: FAMILY MEDICINE

## 2023-10-03 PROCEDURE — 90662 IIV NO PRSV INCREASED AG IM: CPT | Performed by: FAMILY MEDICINE

## 2023-10-03 PROCEDURE — G0439 PPPS, SUBSEQ VISIT: HCPCS | Performed by: FAMILY MEDICINE

## 2023-10-03 PROCEDURE — 1160F RVW MEDS BY RX/DR IN RCRD: CPT | Performed by: FAMILY MEDICINE

## 2023-10-03 PROCEDURE — 1159F MED LIST DOCD IN RCRD: CPT | Performed by: FAMILY MEDICINE

## 2023-10-03 PROCEDURE — 3077F SYST BP >= 140 MM HG: CPT | Performed by: FAMILY MEDICINE

## 2023-10-03 PROCEDURE — 3078F DIAST BP <80 MM HG: CPT | Performed by: FAMILY MEDICINE

## 2023-10-03 PROCEDURE — 1170F FXNL STATUS ASSESSED: CPT | Performed by: FAMILY MEDICINE

## 2023-10-03 PROCEDURE — G0008 ADMIN INFLUENZA VIRUS VAC: HCPCS | Performed by: FAMILY MEDICINE

## 2023-10-03 RX ORDER — LEVOTHYROXINE SODIUM 88 UG/1
88 TABLET ORAL DAILY
Qty: 90 TABLET | Refills: 1 | Status: SHIPPED | OUTPATIENT
Start: 2023-10-03

## 2023-10-03 RX ORDER — BENAZEPRIL HYDROCHLORIDE 10 MG/1
10 TABLET ORAL 2 TIMES DAILY
Qty: 180 TABLET | Refills: 1 | Status: SHIPPED | OUTPATIENT
Start: 2023-10-03

## 2023-10-03 RX ORDER — ESCITALOPRAM OXALATE 5 MG/1
5 TABLET ORAL DAILY
Qty: 90 TABLET | Refills: 1 | Status: SHIPPED | OUTPATIENT
Start: 2023-10-03

## 2023-10-03 NOTE — PATIENT INSTRUCTIONS
"Exercises to do While Sitting    Exercises that you do while sitting (chair exercises) can give you many of the same benefits as full exercise. Benefits include strengthening your heart, burning calories, and keeping muscles and joints healthy. Exercise can also improve your mood and help with depression and anxiety.  You may benefit from chair exercises if you are unable to do standing exercises due to:  Diabetic foot pain.  Obesity.  Illness.  Arthritis.  Recovery from surgery or injury.  Breathing problems.  Balance problems.  Another type of disability.  Before starting chair exercises, check with your health care provider or a physical therapist to find out how much exercise you can tolerate and which exercises are safe for you. If your health care provider approves:  Start out slowly and build up over time. Aim to work up to about 10-20 minutes for each exercise session.  Make exercise part of your daily routine.  Drink water when you exercise. Do not wait until you are thirsty. Drink every 10-15 minutes.  Stop exercising right away if you have pain, nausea, shortness of breath, or dizziness.  If you are exercising in a wheelchair, make sure to lock the wheels.  Ask your health care provider whether you can do miri chi or yoga. Many positions in these mind-body exercises can be modified to do while seated.  Warm-up  Before starting other exercises:  Sit up as straight as you can. Have your knees bent at 90 degrees, which is the shape of the capital letter \"L.\" Keep your feet flat on the floor.  Sit at the front edge of your chair, if you can.  Pull in (tighten) the muscles in your abdomen and stretch your spine and neck as straight as you can. Hold this position for a few minutes.  Breathe in and out evenly. Try to concentrate on your breathing, and relax your mind.  Stretching  Exercise A: Arm stretch  Hold your arms out straight in front of your body.  Bend your hands at the wrist with your fingers pointing " "up, as if signaling someone to stop. Notice the slight tension in your forearms as you hold the position.  Keeping your arms out and your hands bent, rotate your hands outward as far as you can and hold this stretch. Aim to have your thumbs pointing up and your pinkie fingers pointing down.  Slowly repeat arm stretches for one minute as tolerated.  Exercise B: Leg stretch  If you can move your legs, try to \"draw\" letters on the floor with the toes of your foot. Write your name with one foot.  Write your name with the toes of your other foot.  Slowly repeat the movements for one minute as tolerated.  Exercise C: Reach for the diego  Reach your hands as far over your head as you can to stretch your spine.  Move your hands and arms as if you are climbing a rope.  Slowly repeat the movements for one minute as tolerated.  Range of motion exercises  Exercise A: Shoulder roll  Let your arms hang loosely at your sides.  Lift just your shoulders up toward your ears, then let them relax back down.  When your shoulders feel loose, rotate your shoulders in backward and forward circles.  Do shoulder rolls slowly for one minute as tolerated.  Exercise B: March in place  As if you are marching, pump your arms and lift your legs up and down. Lift your knees as high as you can.  If you are unable to lift your knees, just pump your arms and move your ankles and feet up and down.  March in place for one minute as tolerated.  Exercise C: Seated jumping jacks  Let your arms hang down straight.  Keeping your arms straight, lift them up over your head. Aim to point your fingers to the ceiling.  While you lift your arms, straighten your legs and slide your heels along the floor to your sides, as wide as you can.  As you bring your arms back down to your sides, slide your legs back together.  If you are unable to use your legs, just move your arms.  Slowly repeat seated jumping jacks for one minute as tolerated.  Strengthening " exercises  Exercise A: Shoulder squeeze  Hold your arms straight out from your body to your sides, with your elbows bent and your fists pointed at the ceiling.  Keeping your arms in the bent position, move them forward so your elbows and forearms meet in front of your face.  Open your arms back out as wide as you can with your elbows still bent, until you feel your shoulder blades squeezing together. Hold for 5 seconds.  Slowly repeat the movements forward and backward for one minute as tolerated.  Contact a health care provider if:  You have to stop exercising due to any of the following:  Pain.  Nausea.  Shortness of breath.  Dizziness.  Fatigue.  You have significant pain or soreness after exercising.  Get help right away if:  You have chest pain.  You have difficulty breathing.  These symptoms may represent a serious problem that is an emergency. Do not wait to see if the symptoms will go away. Get medical help right away. Call your local emergency services (911 in the [place] Do not drive yourself to the hospital.  Summary  Exercises that you do while sitting (chair exercises) can strengthen your heart, burn calories, and keep muscles and joints healthy.  You may benefit from chair exercises if you are unable to do standing exercises due to diabetic foot pain, obesity, recovery from surgery or injury, or other conditions.  Before starting chair exercises, check with your health care provider or a physical therapist to find out how much exercise you can tolerate and which exercises are safe for you.  This information is not intended to replace advice given to you by your health care provider. Make sure you discuss any questions you have with your health care provider.  Document Revised: 02/13/2022 Document Reviewed: 02/13/2022  Elsevier Patient Education © 2023 [employer]

## 2023-10-03 NOTE — PROGRESS NOTES
"The ABCs of the Annual Wellness Visit  Subsequent Medicare Wellness Visit    Subjective    Eileen Plascencia is a 78 y.o. female who presents for a Subsequent Medicare Wellness Visit.    The following portions of the patient's history were reviewed and   updated as appropriate: allergies, current medications, past family history, past medical history, past social history, past surgical history, and problem list.    Compared to one year ago, the patient feels her physical   health is the same.    Compared to one year ago, the patient feels her mental   health is better.    Recent Hospitalizations:  She was not admitted to the hospital during the last year.       Advance Care Planning  Advance Directive is on file.  ACP discussion was held with the patient during this visit. Patient has an advance directive in EMR which is still valid.     Does the patient have evidence of cognitive impairment? Yes    Aspirin is not on active medication list.  Aspirin use is not indicated based on review of current medical condition/s. Risk of harm outweighs potential benefits.  .    Patient Active Problem List   Diagnosis    Bradycardia    Essential hypertension    Alzheimer's disease    Encounter for annual wellness exam in Medicare patient       Current Medical Providers:  Patient Care Team:  Sarah Rea MD as PCP - General (Family Medicine)  Cristian Justin MD as Consulting Physician (Cardiology)  Selwyn Mathur MD as Consulting Physician (Neurology)  Addison Barrera OD (Optometry)    No opioid medication identified on active medication list. I have reviewed chart for other potential  high risk medication/s and harmful drug interactions in the elderly.             Objective    Vitals:    10/03/23 0811   BP: 147/53   BP Location: Left arm   Patient Position: Sitting   Cuff Size: Adult   Pulse: 50   SpO2: 100%  Comment: room air   Weight: 66.1 kg (145 lb 12.8 oz)   Height: 157.5 cm (62.01\")     Estimated body " "mass index is 26.66 kg/m² as calculated from the following:    Height as of this encounter: 157.5 cm (62.01\").    Weight as of this encounter: 66.1 kg (145 lb 12.8 oz).               Lab Results   Component Value Date    TRIG 56 07/24/2023    HDL 57 07/24/2023     (H) 07/24/2023    VLDL 10 07/24/2023        HEALTH RISK ASSESSMENT    Smoking Status:  Social History     Tobacco Use   Smoking Status Never   Smokeless Tobacco Never     Alcohol Consumption:  Social History     Substance and Sexual Activity   Alcohol Use Never     Fall Risk Screen:    STEADI Fall Risk Assessment was completed, and patient is at LOW risk for falls.Assessment completed on:10/3/2023    Depression Screening:      10/3/2023     8:16 AM   PHQ-2/PHQ-9 Depression Screening   Little Interest or Pleasure in Doing Things 0-->not at all   Feeling Down, Depressed or Hopeless 0-->not at all   PHQ-9: Brief Depression Severity Measure Score 0       Health Habits and Functional and Cognitive Screening:      10/3/2023     8:17 AM   Functional & Cognitive Status   Do you have difficulty preparing food and eating? No   Do you have difficulty bathing yourself, getting dressed or grooming yourself? No   Do you have difficulty using the toilet? No   Do you have difficulty moving around from place to place? No   Do you have trouble with steps or getting out of a bed or a chair? No   Current Diet Well Balanced Diet   Dental Exam Up to date   Eye Exam Up to date   Exercise (times per week) 3 times per week   Current Exercises Include Walking   Do you need help using the phone?  No   Are you deaf or do you have serious difficulty hearing?  No   Do you need help to go to places out of walking distance? Yes   Do you need help shopping? Yes   Do you need help preparing meals?  No   Do you need help with housework?  No   Do you need help with laundry? No   Do you need help taking your medications? Yes   Do you need help managing money? Yes   Do you ever drive or " ride in a car without wearing a seat belt? No   Have you felt unusual stress, anger or loneliness in the last month? Yes   Who do you live with? Other   If you need help, do you have trouble finding someone available to you? No   Have you been bothered in the last four weeks by sexual problems? No   Do you have difficulty concentrating, remembering or making decisions? No       Age-appropriate Screening Schedule:  Refer to the list below for future screening recommendations based on patient's age, sex and/or medical conditions. Orders for these recommended tests are listed in the plan section. The patient has been provided with a written plan.    Health Maintenance   Topic Date Due    DXA SCAN  03/03/2023    COVID-19 Vaccine (5 - 2023-24 season) 12/23/2023 (Originally 9/1/2023)    ZOSTER VACCINE (1 of 2) 07/17/2024 (Originally 6/2/1996)    BMI FOLLOWUP  07/17/2024    LIPID PANEL  07/24/2024    ANNUAL WELLNESS VISIT  10/03/2024    TDAP/TD VACCINES (2 - Td or Tdap) 09/19/2028    HEPATITIS C SCREENING  Completed    INFLUENZA VACCINE  Completed    Pneumococcal Vaccine 65+  Completed    COLORECTAL CANCER SCREENING  Discontinued              Outpatient Medications Prior to Visit   Medication Sig Dispense Refill    Cyanocobalamin (VITAMIN B12 PO) Take  by mouth.      VITAMIN D PO Take  by mouth Daily.      benazepril (LOTENSIN) 10 MG tablet Take 1 tablet by mouth 2 (Two) Times a Day. 180 tablet 0    escitalopram (LEXAPRO) 5 MG tablet Take 1 tablet by mouth Daily. 90 tablet 0    levothyroxine (SYNTHROID, LEVOTHROID) 88 MCG tablet Take 1 tablet by mouth Daily. 90 tablet 0     No facility-administered medications prior to visit.       CMS Preventative Services Quick Reference  Risk Factors Identified During Encounter  Depression/Dysphoria: Current medication is effective, no change recommended  Immunizations Discussed/Encouraged: Influenza, Shingrix, and COVID19  Dental Screening Recommended  Vision Screening Recommended  The  above risks/problems have been discussed with the patient.  Pertinent information has been shared with the patient in the After Visit Summary.  An After Visit Summary and PPPS were made available to the patient.    Follow Up:   Next Medicare Wellness visit to be scheduled in 1 year.       Additional E&M Note during same encounter follows:  Patient has multiple medical problems which are significant and separately identifiable that require additional work above and beyond the Medicare Wellness Visit.         Eileen Plascencia presents to [facility].    Chief Complaint:        Subjective   History of Present Illness:  Hypertension  Pertinent negatives include no blurred vision, chest pain, palpitations or shortness of breath.     She needs refills of her medication for her hypothyroidism, she is currently taking Levothyroid, 88 mcg daily.  TSH was last checked 7/24/2023, the result was reported as normal.  She denies any related symptoms.  She reports no symptoms suggestive of adverse medication effect.          She also needs med refills for her essential (primary) hypertension; her current cardiac medication regimen includes an ACE inhibitor benazepril 10 mg twice daily.  Compliance with treatment has been good; she takes her medication as directed.  She is tolerating the medication well without side effects.  Her BP is elevated today and we discussed increasing the benazepril to 20 mg in the morning and 10 at night but she does have a history of dizzy spells when I increased her benazepril from 10 mg to - 20 mg.  That is why she is on 10 mg twice a day and she tolerates this medication well.  She will try to increase her benazepril to 20 mg in the morning and 10 mg at night and let me know if she has any issues with recurring dizzy spells.  She is to stop the 20 mg a.m. dose if she does.    She has chronic memory issues and was diagnosed with Alzheimer's dementia by Dr. Mathur.   Her memory issues remain mild and patient and family defer medication treatment at this time.  Her depression is stable with Lexapro.  She has underlying grief status post her  passing away       Echo-2019, normal EF, mild TR, MR and mild to moderate aortic insufficiency-we will repeat echo 2024, 5-year follow-up          Result Review   The following data was reviewed by Sarah Rea MD on 10/03/2023.  Lab Results   Component Value Date    WBC 4.49 07/24/2023    HGB 14.5 07/24/2023    HCT 45.0 07/24/2023    MCV 90.0 07/24/2023     07/24/2023     Lab Results   Component Value Date    GLUCOSE 89 07/24/2023    BUN 14 07/24/2023    CREATININE 1.19 (H) 07/24/2023    EGFR 47.2 (L) 07/24/2023    BCR 11.8 07/24/2023    K 4.4 07/24/2023    CO2 26.9 07/24/2023    CALCIUM 9.4 07/24/2023    ALBUMIN 4.2 07/24/2023    BILITOT 0.6 07/24/2023    AST 19 07/24/2023    ALT 10 07/24/2023     Lab Results   Component Value Date    CHOL 195 07/24/2023    CHLPL 176 03/18/2019    TRIG 56 07/24/2023    HDL 57 07/24/2023     (H) 07/24/2023     Lab Results   Component Value Date    TSH 0.563 07/24/2023     No results found for: HGBA1C  No results found for: PSA      Lab Results   Component Value Date    VJMM08JC 53.0 10/13/2022             Assessment and Plan:   Diagnoses and all orders for this visit:    1. Encounter for annual wellness exam in Medicare patient (Primary)    2. Colon cancer screening  Comments:  Done with colonoscopy screenings due to age    3. Postmenopausal state  Comments:  DEXA is due and she has an appointment scheduled in October    4. Encounter for immunization  Comments:  Flu vaccine given, recommend COVID booster and Shingrix (she is to check with pharmacy for this vaccination)    5. Encounter for screening mammogram for breast cancer  Comments:  Mammogram reviewed and WNL    6. Essential hypertension  Comments:  Not at goal.  We will have her increase benazepril to 20 mg in amand 10 mg  "qhs and stop if dizziness reoccurs.  She is to check home BPs and call if >140/90  Orders:  -     benazepril (LOTENSIN) 10 MG tablet; Take 1 tablet by mouth 2 (Two) Times a Day.  Dispense: 180 tablet; Refill: 1    7. Current mild episode of major depressive disorder without prior episode  Comments:  Stable on Lexapro.  Will refill medication for her today  Orders:  -     escitalopram (LEXAPRO) 5 MG tablet; Take 1 tablet by mouth Daily.  Dispense: 90 tablet; Refill: 1    8. Acquired hypothyroidism  Comments:  TSH reviewed and WNL, meds refilled today  Orders:  -     levothyroxine (SYNTHROID, LEVOTHROID) 88 MCG tablet; Take 1 tablet by mouth Daily.  Dispense: 90 tablet; Refill: 1    9. Murmur, cardiac  Comments:  2019 echo showed mild/mod aortic insufficiency-will repeat echo 2024.    Other orders  -     Fluzone High-Dose 65+yrs (1152-4435)                    Medications:      Current Outpatient Medications:     benazepril (LOTENSIN) 10 MG tablet, Take 1 tablet by mouth 2 (Two) Times a Day., Disp: 180 tablet, Rfl: 1    Cyanocobalamin (VITAMIN B12 PO), Take  by mouth., Disp: , Rfl:     escitalopram (LEXAPRO) 5 MG tablet, Take 1 tablet by mouth Daily., Disp: 90 tablet, Rfl: 1    levothyroxine (SYNTHROID, LEVOTHROID) 88 MCG tablet, Take 1 tablet by mouth Daily., Disp: 90 tablet, Rfl: 1    VITAMIN D PO, Take  by mouth Daily., Disp: , Rfl:        Objective   Vital Signs:   /53 (BP Location: Left arm, Patient Position: Sitting, Cuff Size: Adult)   Pulse 50   Ht 157.5 cm (62.01\")   Wt 66.1 kg (145 lb 12.8 oz)   SpO2 100% Comment: room air  BMI 26.66 kg/m²       Physical Exam:  Physical Exam  Vitals and nursing note reviewed.   Constitutional:       General: She is not in acute distress.     Appearance: Normal appearance. She is not ill-appearing, toxic-appearing or diaphoretic.   HENT:      Head: Normocephalic and atraumatic.      Right Ear: Tympanic membrane, ear canal and external ear normal.      Left Ear: " Tympanic membrane, ear canal and external ear normal.      Nose: No congestion or rhinorrhea.      Mouth/Throat:      Mouth: Mucous membranes are moist.      Pharynx: Oropharynx is clear. No oropharyngeal exudate or posterior oropharyngeal erythema.   Eyes:      Extraocular Movements: Extraocular movements intact.      Conjunctiva/sclera: Conjunctivae normal.      Pupils: Pupils are equal, round, and reactive to light.   Cardiovascular:      Rate and Rhythm: Normal rate and regular rhythm.      Heart sounds: Murmur heard.   Pulmonary:      Effort: Pulmonary effort is normal.      Breath sounds: Normal breath sounds. No wheezing, rhonchi or rales.   Abdominal:      General: Abdomen is flat.      Palpations: Abdomen is soft. There is no mass.      Tenderness: There is no abdominal tenderness.      Hernia: No hernia is present.   Musculoskeletal:      Cervical back: Neck supple. No rigidity.      Right lower leg: No edema.      Left lower leg: No edema.   Lymphadenopathy:      Cervical: No cervical adenopathy.   Skin:     General: Skin is warm and dry.   Neurological:      General: No focal deficit present.      Mental Status: She is alert and oriented to person, place, and time. Mental status is at baseline.   Psychiatric:         Mood and Affect: Mood normal.         Behavior: Behavior normal.         Thought Content: Thought content normal.         Judgment: Judgment normal.                   Review of Systems:  Review of Systems   Constitutional:  Negative for chills, fatigue and fever.   HENT:  Negative for ear pain, sinus pressure and sore throat.    Eyes:  Negative for blurred vision and double vision.   Respiratory:  Negative for cough, shortness of breath and wheezing.    Cardiovascular:  Negative for chest pain and palpitations.   Gastrointestinal:  Negative for abdominal pain, blood in stool, constipation, diarrhea, nausea and vomiting.   Skin:  Negative for rash.   Neurological:  Negative for dizziness and  headache.   Psychiatric/Behavioral:  Negative for sleep disturbance and suicidal ideas. The patient is not nervous/anxious.           Follow Up   Return in about 6 months (around 4/3/2024).    Part of this note may be an electronic transcription/translation of spoken language to printed   text using the Dragon Dictation System.            Medical History:  Past Medical History:    Abnormal weight loss    Allergies    BENADRYL   MODERATE    Alzheimer's disease    AR (allergic rhinitis)    UNSPECIFIED    Asymptomatic menopausal state    Bradycardia    Bradycardia, unspecified    Breast cancer    NO CHEMO OR RADIATION    Cardiac murmur, unspecified    Cervicalgia    Contusion of elbow and forearm, left, sequela    Essential (primary) hypertension    Essential hypertension    Hypertension    Hypothyroidism, unspecified    Major depressive disorder, single episode, mild    Memory loss    Nonrheumatic aortic valve disorder, unspecified    Overweight    Personal history of malignant neoplasm of breast    Polyp of nasal cavity    Rheumatic mitral stenosis    Unspecified fall, subsequent encounter    Vitamin B12 deficiency anemia due to intrinsic factor deficiency    Vitamin D deficiency, unspecified     Past Surgical History:    COLONOSCOPY    MASTECTOMY    TONSILLECTOMY AND ADENOIDECTOMY      Family History   Problem Relation Age of Onset    Cancer Mother     Coronary artery disease Father     Peripheral vascular disease Father     Diabetes type II Father      Social History     Tobacco Use    Smoking status: Never    Smokeless tobacco: Never   Substance Use Topics    Alcohol use: Never       Health Maintenance Due   Topic Date Due    DXA SCAN  03/03/2023        Immunization History   Administered Date(s) Administered    COVID-19 (PFIZER) BIVALENT 12+YRS 10/13/2022    COVID-19 (PFIZER) Purple Cap Monovalent 04/28/2021, 05/19/2021    Covid-19 (Pfizer) Gray Cap Monovalent 04/12/2022    Fluzone High Dose =>65 Years ([employer]  ONLY) 09/23/2016, 09/12/2017    Fluzone High-Dose 65+yrs 08/18/2020, 10/13/2022, 10/03/2023    Influenza, Unspecified 09/30/2019    Pneumococcal Conjugate 13-Valent (PCV13) 03/04/2016, 08/18/2020    Pneumococcal Polysaccharide (PPSV23) 04/01/2013    Tdap 09/19/2018       No Known Allergies

## 2023-10-25 ENCOUNTER — HOSPITAL ENCOUNTER (OUTPATIENT)
Dept: BONE DENSITY | Facility: HOSPITAL | Age: 78
Discharge: HOME | End: 2023-10-25
Admitting: FAMILY MEDICINE
Payer: MEDICARE

## 2023-10-25 DIAGNOSIS — Z78.0 POSTMENOPAUSAL STATE: ICD-10-CM

## 2023-10-25 PROCEDURE — 77080 DXA BONE DENSITY AXIAL: CPT

## 2023-12-19 ENCOUNTER — OFFICE VISIT (OUTPATIENT)
Dept: FAMILY MEDICINE CLINIC | Age: 78
End: 2023-12-19
Payer: MEDICARE

## 2023-12-19 VITALS
HEIGHT: 62 IN | WEIGHT: 142.4 LBS | OXYGEN SATURATION: 94 % | BODY MASS INDEX: 26.2 KG/M2 | SYSTOLIC BLOOD PRESSURE: 147 MMHG | TEMPERATURE: 97.5 F | DIASTOLIC BLOOD PRESSURE: 59 MMHG | HEART RATE: 54 BPM

## 2023-12-19 DIAGNOSIS — G30.9 ALZHEIMER'S DISEASE: ICD-10-CM

## 2023-12-19 DIAGNOSIS — F32.4 MAJOR DEPRESSIVE DISORDER WITH SINGLE EPISODE, IN PARTIAL REMISSION: ICD-10-CM

## 2023-12-19 DIAGNOSIS — F02.80 ALZHEIMER'S DISEASE: ICD-10-CM

## 2023-12-19 DIAGNOSIS — E55.9 VITAMIN D DEFICIENCY: ICD-10-CM

## 2023-12-19 DIAGNOSIS — I10 ESSENTIAL HYPERTENSION: Primary | ICD-10-CM

## 2023-12-19 DIAGNOSIS — Z78.0 POSTMENOPAUSAL STATE: ICD-10-CM

## 2023-12-19 DIAGNOSIS — E03.9 ACQUIRED HYPOTHYROIDISM: ICD-10-CM

## 2023-12-19 DIAGNOSIS — D51.8 OTHER VITAMIN B12 DEFICIENCY ANEMIA: ICD-10-CM

## 2023-12-19 DIAGNOSIS — E66.3 OVERWEIGHT (BMI 25.0-29.9): ICD-10-CM

## 2023-12-19 DIAGNOSIS — R01.1 MURMUR, CARDIAC: ICD-10-CM

## 2023-12-19 DIAGNOSIS — Z90.11 H/O TOTAL MASTECTOMY OF RIGHT BREAST: ICD-10-CM

## 2023-12-19 PROBLEM — F32.0 MAJOR DEPRESSIVE DISORDER, SINGLE EPISODE, MILD: Status: ACTIVE | Noted: 2023-12-19

## 2023-12-19 PROBLEM — T78.40XA ALLERGIES: Status: ACTIVE | Noted: 2023-12-19

## 2023-12-19 PROBLEM — C50.919 BREAST CANCER: Status: ACTIVE | Noted: 2023-12-19

## 2023-12-19 RX ORDER — CEPHALEXIN 500 MG/1
1 TABLET ORAL EVERY 12 HOURS SCHEDULED
COMMUNITY
Start: 2023-12-04 | End: 2023-12-19

## 2023-12-19 RX ORDER — MULTIVIT-MIN/IRON/FOLIC ACID/K 18-600-40
1 CAPSULE ORAL DAILY
Start: 2023-12-19

## 2023-12-19 NOTE — PROGRESS NOTES
"Eileen Plascencia presents to [facility].    Chief Complaint: Admit to [facility]    Subjective     History of Present Illness:  XUAN Gray presents today for a physical exam for admitted to [facility].  She is being admitted on 12/26/2023.  She has a PMH significant for hypothyroid disease, hypertension, Alzheimer's dementia, mitral stenosis with cardiac murmur, vitamin B12 and vitamin D deficiency, history of falls, and chronic allergies.  Current medications include benazepril, vitamin B12, vitamin D, levothyroxine 88 mcg daily and Lexapro 5 mg daily.    Her hypothyroidism is stable and well-controlled on Levothyroid, 88 mcg daily.  TSH was last checked 7/24/2023, the result was reported as normal.  She denies any related symptoms.  She reports no symptoms suggestive of adverse medication effect.          Her essential (primary) hypertension is stable and well-controlled on ACE inhibitor benazepril 10 mg twice daily.  Compliance with treatment has been good; she takes her medication as directed.  She is tolerating the medication well without side effects.  Home BP are \"well controlled\"       She has chronic memory issues with the diagnosis of Alzheimer's dementia by Dr. Mathur.  Her memory issues remain mild and patient and family defer medication treatment at this time.      She presents with chronic depression which is stable with Lexapro.  She has underlying grief status post her  passing away.  She is sleeping ok.  Denies SI/HI.  Denies anxiety.      History of mitral regurg and aortic insufficiency.  Echo-2019, normal EF, mild TR, MR and mild to moderate aortic insufficiency-we will repeat echo 2024, 5-year follow-up          Result Review   The following data was reviewed by Sarah Rea MD on 12/19/2023.  Lab Results   Component Value Date    WBC 4.49 07/24/2023    HGB 14.5 07/24/2023    HCT 45.0 07/24/2023    MCV 90.0 " "07/24/2023     07/24/2023     Lab Results   Component Value Date    GLUCOSE 89 07/24/2023    BUN 14 07/24/2023    CREATININE 1.19 (H) 07/24/2023    EGFR 47.2 (L) 07/24/2023    BCR 11.8 07/24/2023    K 4.4 07/24/2023    CO2 26.9 07/24/2023    CALCIUM 9.4 07/24/2023    ALBUMIN 4.2 07/24/2023    BILITOT 0.6 07/24/2023    AST 19 07/24/2023    ALT 10 07/24/2023     Lab Results   Component Value Date    CHOL 195 07/24/2023    CHLPL 176 03/18/2019    TRIG 56 07/24/2023    HDL 57 07/24/2023     (H) 07/24/2023     Lab Results   Component Value Date    TSH 0.563 07/24/2023     No results found for: \"HGBA1C\"  No results found for: \"PSA\"      Lab Results   Component Value Date    XAAK60BY 53.0 10/13/2022               Assessment and Plan:   Diagnoses and all orders for this visit:    1. Essential hypertension (Primary)  Comments:  Elevated today but she is nervous.  Home BPs are stable and well-controlled on current medication.  Continue lisinopril 10 mg twice daily and low NA diet    2. Acquired hypothyroidism  Comments:  Stable on current dose of levothyroxine 88 mcg daily.  Recent TSH was WNL.  No changes in current meds/Tx plan    3. Overweight (BMI 25.0-29.9)  Comments:  Her weight is overall good with a BMI of 26.  Recommend daily exercise    4. Postmenopausal state  Comments:  Continue  vitamin D supplementation and weightbearing activity as tolerated like walking    5. Murmur, cardiac  Comments:  No acute issues.  Recommend repeat echo in 2024    6. Alzheimer's disease  Comments:  Family defers medication at this time.  She has been evaluated with neurology    7. Vitamin D deficiency  Comments:  Continue vitamin D supplementation 2000 units daily, OTC is okay  Orders:  -     Vitamin D, Cholecalciferol, 50 MCG (2000 UT) capsule; Take 1 capsule by mouth Daily.    8. Other vitamin B12 deficiency anemia  Comments:  Continue vitamin B12 over-the-counter supplementation    9. H/O total mastectomy of right " "breast  Comments:  Recommend ongoing yearly screening mammograms    10. Major depressive disorder with single episode, in partial remission  Comments:  Stable on Lexapro.  No changes needed current meds or Tx plan.  No SI/HI.  She is sleeping well              Objective     Medications:  Current Outpatient Medications   Medication Instructions    benazepril (LOTENSIN) 10 mg, Oral, 2 Times Daily    Cyanocobalamin (VITAMIN B12 PO) Oral    escitalopram (LEXAPRO) 5 mg, Oral, Daily    levothyroxine (SYNTHROID, LEVOTHROID) 88 mcg, Oral, Daily    Vitamin D (Cholecalciferol) 50 mcg, Oral, Daily    VITAMIN D PO Oral, Daily        Vital Signs:   /59 (BP Location: Left arm, Patient Position: Sitting)   Pulse 54   Temp 97.5 °F (36.4 °C) (Oral)   Ht 157.5 cm (62.01\")   Wt 64.6 kg (142 lb 6.4 oz)   SpO2 94%   BMI 26.04 kg/m²             Physical Exam:  Physical Exam  Vitals and nursing note reviewed.   Constitutional:       General: She is not in acute distress.     Appearance: Normal appearance. She is not ill-appearing, toxic-appearing or diaphoretic.   HENT:      Head: Normocephalic and atraumatic.      Right Ear: Tympanic membrane, ear canal and external ear normal.      Left Ear: Tympanic membrane, ear canal and external ear normal.      Nose: No congestion or rhinorrhea.      Mouth/Throat:      Mouth: Mucous membranes are moist.      Pharynx: Oropharynx is clear. No oropharyngeal exudate or posterior oropharyngeal erythema.   Eyes:      Extraocular Movements: Extraocular movements intact.      Conjunctiva/sclera: Conjunctivae normal.      Pupils: Pupils are equal, round, and reactive to light.   Cardiovascular:      Rate and Rhythm: Normal rate and regular rhythm.      Pulses: Normal pulses.      Heart sounds: Murmur heard.   Pulmonary:      Effort: Pulmonary effort is normal.      Breath sounds: Normal breath sounds. No wheezing, rhonchi or rales.   Abdominal:      General: Abdomen is flat.      Palpations: " Abdomen is soft. There is no mass.      Tenderness: There is no abdominal tenderness.      Hernia: No hernia is present.   Musculoskeletal:      Cervical back: Neck supple. No rigidity.      Right lower leg: No edema.      Left lower leg: No edema.   Lymphadenopathy:      Cervical: No cervical adenopathy.   Skin:     General: Skin is warm and dry.   Neurological:      General: No focal deficit present.      Mental Status: She is alert and oriented to person, place, and time. Mental status is at baseline.      Cranial Nerves: Cranial nerves 2-12 are intact. No cranial nerve deficit.      Sensory: Sensation is intact. No sensory deficit.      Motor: No weakness.      Gait: Gait normal.   Psychiatric:         Mood and Affect: Mood normal.         Speech: Speech normal.         Behavior: Behavior normal.         Thought Content: Thought content normal.         Cognition and Memory: Cognition is impaired. Memory is impaired.         Review of Systems:  Review of Systems   Constitutional:  Negative for chills and fever.   HENT:  Negative for ear pain, sinus pressure and sore throat.    Eyes:  Negative for blurred vision and double vision.   Respiratory:  Negative for cough, shortness of breath and wheezing.    Cardiovascular:  Negative for chest pain and palpitations.   Gastrointestinal:  Negative for abdominal pain, blood in stool, constipation, diarrhea, nausea and vomiting.   Skin:  Negative for rash.   Neurological:  Positive for weakness and memory problem. Negative for dizziness and headache.   Psychiatric/Behavioral:  Positive for depressed mood. Negative for sleep disturbance and suicidal ideas. The patient is not nervous/anxious.               Follow Up   Return in about 6 months (around 6/19/2024) for Recheck.    Part of this note may be an electronic transcription/translation of spoken language to printed   text using the Dragon Dictation System.            Medical History:  Medications Discontinued During This  Encounter   Medication Reason    Cephalexin 500 MG tablet *Therapy completed      Past Medical History:    Allergies    BENADRYL   MODERATE    Alzheimer's disease    Asymptomatic menopausal state    Bradycardia    Breast cancer    NO CHEMO OR RADIATION    Cardiac murmur, unspecified    Cervicalgia    Contusion of elbow and forearm, left, sequela    Hypertension    Hypothyroidism, unspecified    Major depressive disorder, single episode, mild    Memory loss    Nonrheumatic aortic valve disorder, unspecified    Overweight    Personal history of malignant neoplasm of breast    Polyp of nasal cavity    Rheumatic mitral stenosis    Unspecified fall, subsequent encounter    Vitamin B12 deficiency anemia due to intrinsic factor deficiency    Vitamin D deficiency, unspecified     Past Surgical History:    COLONOSCOPY    MASTECTOMY    TONSILLECTOMY AND ADENOIDECTOMY      Family History   Problem Relation Age of Onset    Cancer Mother     Coronary artery disease Father     Peripheral vascular disease Father     Diabetes type II Father      Social History     Tobacco Use    Smoking status: Never    Smokeless tobacco: Never   Substance Use Topics    Alcohol use: Never       There are no preventive care reminders to display for this patient.     Immunization History   Administered Date(s) Administered    COVID-19 (PFIZER) BIVALENT 12+YRS 10/13/2022    COVID-19 (PFIZER) Purple Cap Monovalent 04/28/2021, 05/19/2021    Covid-19 (Pfizer) Gray Cap Monovalent 04/12/2022    Fluzone High Dose =>65 Years ([employer] ONLY) 09/23/2016, 09/12/2017    Fluzone High-Dose 65+yrs 08/18/2020, 10/13/2022, 10/03/2023    Influenza, Unspecified 09/30/2019    Pneumococcal Conjugate 13-Valent (PCV13) 03/04/2016, 08/18/2020    Pneumococcal Polysaccharide (PPSV23) 04/01/2013    Tdap 09/19/2018       No Known Allergies

## 2024-06-24 ENCOUNTER — TRANSCRIBE ORDERS (OUTPATIENT)
Dept: ADMINISTRATIVE | Facility: HOSPITAL | Age: 79
End: 2024-06-24
Payer: MEDICARE

## 2024-06-24 DIAGNOSIS — Z12.31 SCREENING MAMMOGRAM FOR BREAST CANCER: Primary | ICD-10-CM

## 2024-07-26 ENCOUNTER — HOSPITAL ENCOUNTER (OUTPATIENT)
Dept: MAMMOGRAPHY | Facility: HOSPITAL | Age: 79
Discharge: HOME | End: 2024-07-26
Admitting: FAMILY MEDICINE
Payer: MEDICARE

## 2024-07-26 DIAGNOSIS — Z12.31 SCREENING MAMMOGRAM FOR BREAST CANCER: ICD-10-CM

## 2024-07-26 PROCEDURE — 77063 BREAST TOMOSYNTHESIS BI: CPT

## 2024-07-26 PROCEDURE — 77067 SCR MAMMO BI INCL CAD: CPT

## 2025-07-01 ENCOUNTER — TRANSCRIBE ORDERS (OUTPATIENT)
Dept: ADMINISTRATIVE | Facility: HOSPITAL | Age: 80
End: 2025-07-01
Payer: MEDICARE

## 2025-07-01 DIAGNOSIS — Z12.31 BREAST CANCER SCREENING BY MAMMOGRAM: Primary | ICD-10-CM

## 2025-07-29 ENCOUNTER — HOSPITAL ENCOUNTER (OUTPATIENT)
Dept: MAMMOGRAPHY | Facility: HOSPITAL | Age: 80
Discharge: HOME | End: 2025-07-29
Admitting: FAMILY MEDICINE
Payer: MEDICARE

## 2025-07-29 DIAGNOSIS — Z12.31 BREAST CANCER SCREENING BY MAMMOGRAM: ICD-10-CM

## 2025-07-29 PROCEDURE — 77067 SCR MAMMO BI INCL CAD: CPT

## 2025-07-29 PROCEDURE — 77063 BREAST TOMOSYNTHESIS BI: CPT
